# Patient Record
Sex: MALE | Race: WHITE | NOT HISPANIC OR LATINO | Employment: OTHER | URBAN - NONMETROPOLITAN AREA
[De-identification: names, ages, dates, MRNs, and addresses within clinical notes are randomized per-mention and may not be internally consistent; named-entity substitution may affect disease eponyms.]

---

## 2022-02-15 ENCOUNTER — HOSPITAL ENCOUNTER (EMERGENCY)
Facility: HOSPITAL | Age: 57
Discharge: HOME | End: 2022-02-15
Attending: EMERGENCY MEDICINE | Admitting: EMERGENCY MEDICINE
Payer: COMMERCIAL

## 2022-02-15 ENCOUNTER — APPOINTMENT (EMERGENCY)
Dept: RADIOLOGY | Facility: HOSPITAL | Age: 57
End: 2022-02-15
Payer: COMMERCIAL

## 2022-02-15 VITALS
HEART RATE: 66 BPM | RESPIRATION RATE: 18 BRPM | TEMPERATURE: 97.4 F | SYSTOLIC BLOOD PRESSURE: 126 MMHG | WEIGHT: 190 LBS | OXYGEN SATURATION: 97 % | DIASTOLIC BLOOD PRESSURE: 71 MMHG

## 2022-02-15 DIAGNOSIS — M54.12 CERVICAL RADICULOPATHY: Primary | ICD-10-CM

## 2022-02-15 DIAGNOSIS — M62.838 NECK MUSCLE SPASM: ICD-10-CM

## 2022-02-15 PROCEDURE — 99284 EMERGENCY DEPT VISIT MOD MDM: CPT | Performed by: EMERGENCY MEDICINE

## 2022-02-15 PROCEDURE — 96372 THER/PROPH/DIAG INJ SC/IM: CPT

## 2022-02-15 PROCEDURE — 99283 EMERGENCY DEPT VISIT LOW MDM: CPT

## 2022-02-15 PROCEDURE — 72040 X-RAY EXAM NECK SPINE 2-3 VW: CPT

## 2022-02-15 RX ORDER — ROSUVASTATIN CALCIUM 10 MG/1
TABLET, COATED ORAL
COMMUNITY
Start: 2021-12-03

## 2022-02-15 RX ORDER — CYCLOBENZAPRINE HCL 10 MG
10 TABLET ORAL ONCE
Status: COMPLETED | OUTPATIENT
Start: 2022-02-15 | End: 2022-02-15

## 2022-02-15 RX ORDER — KETOROLAC TROMETHAMINE 30 MG/ML
30 INJECTION, SOLUTION INTRAMUSCULAR; INTRAVENOUS ONCE
Status: COMPLETED | OUTPATIENT
Start: 2022-02-15 | End: 2022-02-15

## 2022-02-15 RX ORDER — CELECOXIB 100 MG/1
100 CAPSULE ORAL 2 TIMES DAILY PRN
Qty: 30 CAPSULE | Refills: 0 | Status: SHIPPED | OUTPATIENT
Start: 2022-02-15 | End: 2022-04-04

## 2022-02-15 RX ORDER — CYCLOBENZAPRINE HCL 10 MG
10 TABLET ORAL 2 TIMES DAILY PRN
Qty: 20 TABLET | Refills: 0 | Status: SHIPPED | OUTPATIENT
Start: 2022-02-15 | End: 2022-03-14

## 2022-02-15 RX ADMIN — KETOROLAC TROMETHAMINE 30 MG: 30 INJECTION, SOLUTION INTRAMUSCULAR at 10:18

## 2022-02-15 RX ADMIN — CYCLOBENZAPRINE HYDROCHLORIDE 10 MG: 10 TABLET, FILM COATED ORAL at 10:19

## 2022-02-15 NOTE — ED PROVIDER NOTES
History  Chief Complaint   Patient presents with    Neck Pain     history of herniated disc in his neck this pain feels similiar to how it felt before  took celebrex for the pain, no motrin ot APAP     Patient is a 61-year-old male with history of herniated disc in the cervical spine who presents emergency department complaining of neck pain and spasm on the left side in the lower neck without any trauma or injury started about 1 week ago worse movement and activity has been working overhead at work which exacerbates the pain no focal numbness or weakness  History provided by:  Patient  Neck Pain  Pain location:  L side  Quality:  Aching and cramping  Pain radiates to:  L shoulder  Pain severity:  Moderate  Onset quality:  Gradual  Duration:  1 week  Timing:  Constant  Progression:  Worsening  Chronicity:  New  Associated symptoms: no chest pain, no fever, no headaches, no numbness and no weakness        None       Past Medical History:   Diagnosis Date    Herniated disc, cervical        Past Surgical History:   Procedure Laterality Date    JOINT REPLACEMENT      knee bilateral        No family history on file  I have reviewed and agree with the history as documented  E-Cigarette/Vaping     E-Cigarette/Vaping Substances     Social History     Tobacco Use    Smoking status: Not on file    Smokeless tobacco: Not on file   Substance Use Topics    Alcohol use: Not on file    Drug use: Not on file       Review of Systems   Constitutional: Negative for activity change, appetite change, chills, fatigue and fever  HENT: Negative for congestion, ear pain, rhinorrhea and sore throat  Eyes: Negative for discharge, redness and visual disturbance  Respiratory: Negative for cough, chest tightness, shortness of breath and wheezing  Cardiovascular: Negative for chest pain and palpitations  Gastrointestinal: Negative for abdominal pain, constipation, diarrhea, nausea and vomiting     Endocrine: Negative for polydipsia and polyuria  Genitourinary: Negative for difficulty urinating, dysuria, frequency, hematuria and urgency  Musculoskeletal: Positive for neck pain and neck stiffness  Negative for arthralgias and myalgias  Skin: Negative for color change, pallor and rash  Neurological: Negative for dizziness, weakness, light-headedness, numbness and headaches  Hematological: Negative for adenopathy  Does not bruise/bleed easily  All other systems reviewed and are negative  Physical Exam  Physical Exam  Vitals and nursing note reviewed  Constitutional:       Appearance: He is well-developed  HENT:      Head: Normocephalic and atraumatic  Right Ear: External ear normal       Left Ear: External ear normal       Nose: Nose normal    Eyes:      Conjunctiva/sclera: Conjunctivae normal       Pupils: Pupils are equal, round, and reactive to light  Cardiovascular:      Rate and Rhythm: Normal rate and regular rhythm  Heart sounds: Normal heart sounds  Pulmonary:      Effort: Pulmonary effort is normal  No respiratory distress  Breath sounds: Normal breath sounds  No wheezing or rales  Chest:      Chest wall: No tenderness  Abdominal:      General: Bowel sounds are normal  There is no distension  Palpations: Abdomen is soft  Tenderness: There is no abdominal tenderness  There is no guarding  Musculoskeletal:         General: Normal range of motion  Cervical back: Normal range of motion and neck supple  Spasms and tenderness present  Pain with movement and muscular tenderness present  No spinous process tenderness  Skin:     General: Skin is warm and dry  Neurological:      Mental Status: He is alert and oriented to person, place, and time  Cranial Nerves: No cranial nerve deficit  Sensory: No sensory deficit           Vital Signs  ED Triage Vitals   Temperature Pulse Respirations Blood Pressure SpO2   02/15/22 0958 02/15/22 0958 02/15/22 0958 02/15/22 1000 02/15/22 1000   (!) 97 4 °F (36 3 °C) 66 18 126/71 97 %      Temp Source Heart Rate Source Patient Position - Orthostatic VS BP Location FiO2 (%)   02/15/22 0958 02/15/22 0958 02/15/22 1000 02/15/22 1000 --   Temporal Monitor Sitting Right arm       Pain Score       02/15/22 1000       7           Vitals:    02/15/22 0958 02/15/22 1000   BP:  126/71   Pulse: 66    Patient Position - Orthostatic VS:  Sitting         Visual Acuity      ED Medications  Medications   ketorolac (TORADOL) injection 30 mg (30 mg Intramuscular Given 2/15/22 1018)   cyclobenzaprine (FLEXERIL) tablet 10 mg (10 mg Oral Given 2/15/22 1019)       Diagnostic Studies  Results Reviewed     None                 XR cervical spine 2 or 3 views   ED Interpretation by Judeen Lesches, DO (02/15 1023)   Moderate degenerative changes in lower cervical spine no acute fracture or dislocation                 Procedures  Procedures         ED Course                               SBIRT 22yo+      Most Recent Value   SBIRT (25 yo +)    In order to provide better care to our patients, we are screening all of our patients for alcohol and drug use  Would it be okay to ask you these screening questions? Yes Filed at: 02/15/2022 1007   Initial Alcohol Screen: US AUDIT-C     1  How often do you have a drink containing alcohol? 0 Filed at: 02/15/2022 1007   2  How many drinks containing alcohol do you have on a typical day you are drinking? 0 Filed at: 02/15/2022 1007   3a  Male UNDER 65: How often do you have five or more drinks on one occasion? 0 Filed at: 02/15/2022 1007   3b  FEMALE Any Age, or MALE 65+: How often do you have 4 or more drinks on one occassion? 0 Filed at: 02/15/2022 1007   Audit-C Score 0 Filed at: 02/15/2022 1007   NANCI: How many times in the past year have you    Used an illegal drug or used a prescription medication for non-medical reasons?  Never Filed at: 02/15/2022 1007                    MDM  Number of Diagnoses or Management Options  Cervical radiculopathy: established and worsening  Neck muscle spasm: new and requires workup  Diagnosis management comments: Patient is clinically hemodynamically stable in the emergency department no alarm features of the neck pain present to necessitate emergent neuroimaging the spine  Degenerative changes seen on x-ray history examination consistent with cervical radiculopathy with muscle spasm in the left paraspinal and trapezius muscle  Will treat with NSAIDs and muscle relaxers for now advised supportive care prompt follow-up with PCP and Pain and Spine Medicine referral provided patient has already contacted pain medicine and has appointment arranged for tomorrow for further evaluation and treatment  Return precautions and anticipatory guidance discussed  Amount and/or Complexity of Data Reviewed  Tests in the radiology section of CPT®: ordered and reviewed  Decide to obtain previous medical records or to obtain history from someone other than the patient: yes  Review and summarize past medical records: yes  Independent visualization of images, tracings, or specimens: yes    Risk of Complications, Morbidity, and/or Mortality  Presenting problems: low  Diagnostic procedures: low  Management options: low    Patient Progress  Patient progress: stable      Disposition  Final diagnoses:   Cervical radiculopathy   Neck muscle spasm     Time reflects when diagnosis was documented in both MDM as applicable and the Disposition within this note     Time User Action Codes Description Comment    2/15/2022 10:07 AM Ashtyn Esparza Add [M54 12] Cervical radiculopathy     2/15/2022 10:07 AM Ashtyn Esparza Add [U93 797] Neck muscle spasm       ED Disposition     ED Disposition Condition Date/Time Comment    Discharge Stable Tue Feb 15, 2022 10:07 AM Kimberley Sutton discharge to home/self care              Follow-up Information     Follow up With Specialties Details Why Contact Info    Trina Quintana MD Gastroenterology Schedule an appointment as soon as possible for a visit in 3 days  19 Agnes Urbina MD Pain Medicine Go in 1 day  [address] [place] [address]            Patient's Medications   Discharge Prescriptions    CELECOXIB (CELEBREX) 100 MG CAPSULE    Take 1 capsule (100 mg total) by mouth 2 (two) times a day as needed for mild pain       Start Date: 2/15/2022 End Date: 3/17/2022       Order Dose: 100 mg       Quantity: 30 capsule    Refills: 0    CYCLOBENZAPRINE (FLEXERIL) 10 MG TABLET    Take 1 tablet (10 mg total) by mouth 2 (two) times a day as needed for muscle spasms       Start Date: 2/15/2022 End Date: --       Order Dose: 10 mg       Quantity: 20 tablet    Refills: 0           PDMP Review     None          ED Provider  Electronically Signed by           Jean Nguyen DO  02/15/22 1024

## 2022-02-16 ENCOUNTER — CONSULT (OUTPATIENT)
Dept: PAIN MEDICINE | Facility: CLINIC | Age: 57
End: 2022-02-16
Payer: COMMERCIAL

## 2022-02-16 VITALS
HEIGHT: 70 IN | WEIGHT: 191.6 LBS | BODY MASS INDEX: 27.43 KG/M2 | SYSTOLIC BLOOD PRESSURE: 129 MMHG | DIASTOLIC BLOOD PRESSURE: 79 MMHG | HEART RATE: 72 BPM | TEMPERATURE: 97.8 F | RESPIRATION RATE: 20 BRPM

## 2022-02-16 DIAGNOSIS — M54.12 CERVICAL RADICULOPATHY: Primary | ICD-10-CM

## 2022-02-16 DIAGNOSIS — M62.838 NECK MUSCLE SPASM: ICD-10-CM

## 2022-02-16 PROCEDURE — 99204 OFFICE O/P NEW MOD 45 MIN: CPT | Performed by: ANESTHESIOLOGY

## 2022-02-16 RX ORDER — LIDOCAINE HYDROCHLORIDE 10 MG/ML
5 INJECTION, SOLUTION EPIDURAL; INFILTRATION; INTRACAUDAL; PERINEURAL ONCE
Status: CANCELLED | OUTPATIENT
Start: 2022-02-16 | End: 2022-02-16

## 2022-02-16 RX ORDER — GABAPENTIN 300 MG/1
300 CAPSULE ORAL 3 TIMES DAILY
Qty: 90 CAPSULE | Refills: 0 | Status: SHIPPED | OUTPATIENT
Start: 2022-02-16 | End: 2022-03-14

## 2022-02-16 RX ORDER — METHYLPREDNISOLONE ACETATE 80 MG/ML
80 INJECTION, SUSPENSION INTRA-ARTICULAR; INTRALESIONAL; INTRAMUSCULAR; PARENTERAL; SOFT TISSUE ONCE
Status: CANCELLED | OUTPATIENT
Start: 2022-02-16 | End: 2022-02-16

## 2022-02-16 RX ORDER — 0.9 % SODIUM CHLORIDE 0.9 %
1 VIAL (ML) INJECTION ONCE
Status: CANCELLED | OUTPATIENT
Start: 2022-02-16 | End: 2022-02-16

## 2022-02-16 NOTE — H&P (VIEW-ONLY)
Assessment  1  Cervical radiculopathy  -     gabapentin (NEURONTIN) 300 mg capsule; Take 1 capsule (300 mg total) by mouth 3 (three) times a day  -     Ambulatory referral to Physical Therapy; Future  -     Case request operating room: BLOCK / INJECTION EPIDURAL STEROID CERVICAL C7-T1; Standing  -     Case request operating room: BLOCK / INJECTION EPIDURAL STEROID CERVICAL C7-T1  -     Ambulatory Referral to Pain Management    2  Neck muscle spasm  -     Ambulatory Referral to Pain Management    Left-sided neck pain described primarily radicular features into the C5 and C6 dermatomal distribution accompanied by pain limited weakness numbness and paresthesias  +spurling's maneuver left sided; ttp over cervical paraspinal muscles, pain with cervical facet loading bilaterally, left greater than right  MRI from 2014 reviewed which shows moderate broad-based posterior disc protrusion at C5-C6  Superimposed spondylosis and facet degenerative changes resulting in moderate canal stenosis and marked left and moderate right neural foraminal narrowing  Additionally C6-C7 there is moderate broad-based disc protrusion asymmetrically increased to the left lateral reason at C6-C7  There is findings suspicious for some degree of neural compression  Superimposed spondylosis and changes resulting in mild canal narrowing  Marked left and mild right neural foraminal stenosis  Reasonable at this time to proceed with multimodal pain therapy plan focusing on physical therapy in conjunction with medications and epidural steroid injection to target radicular pain  Plan  -C7-T1 ILESI or alternate level  -gabapentin 300 mg t i d  Ordered for patient; counseled regarding sedative effects of taking this medication and provided up titration calendar  Counseled not to take medication while driving or operating heavy machinery/using stairs  -celebrex 100 mg b i d  prn pain previously prescribed    Patient educated regarding bleeding risk of taking this medication not taking any other nonsteroidal anti-inflammatory medications while taking this medication; counseled thoroughly regarding potential risk of Cardiovascular injury, Kidney injury, Gastrointestinal ulceration/bleeding  Patient voiced understanding  -physical therapy for left sided cervical radiculopathy; Physician directed home exercise plan as per [employer] demonstrated and handouts provided that patient plans to participate with for 1 hour, twice a week for the next 6 weeks  There are risks associated with opioid medications, including dependence, addiction and tolerance  The patient understands and agrees to use these medications only as prescribed  Potential side effects of the medications include, but are not limited to, constipation, drowsiness, addiction, impaired judgment and risk of fatal overdose if not taken as prescribed  The patient was warned against driving while taking sedation medications  Sharing medications is a felony  At this point in time, the patient is showing no signs of addiction, abuse, diversion or suicidal ideation  [place] Prescription Drug Monitoring Program report was reviewed and was appropriate      Complete risks and benefits including bleeding, infection, tissue reaction, nerve injury and allergic reaction were discussed  The approach was demonstrated using models and literature was provided  Verbal and written consent was obtained  My impressions and treatment recommendations were discussed in detail with the patient who verbalized understanding and had no further questions  Discharge instructions were provided  I personally saw and examined the patient and I agree with the above discussed plan of care  My impressions and treatment recommendations were discussed in detail with the patient who verbalized understanding and had no further questions  Discharge instructions were provided   I personally saw and examined the patient and I agree with the above discussed plan of care  New Medications Ordered This Visit   Medications    Omega-3 Fatty Acids (FISH OIL PO)     Sig: Take by mouth    Magnesium Hydroxide (MAGNESIA PO)     Sig: Take by mouth    rosuvastatin (Crestor) 10 MG tablet     Sig: Take by mouth    gabapentin (NEURONTIN) 300 mg capsule     Sig: Take 1 capsule (300 mg total) by mouth 3 (three) times a day     Dispense:  90 capsule     Refill:  0       History of Present Illness    Refugio Lim is a 64 y o  male with pmhx of XOL presenting with a past medical history of left-sided neck pain described primarily as radicular nature  The pain radiates in the C5 and C6 dermatomal distributions and is primarily left-sided  Patient had an acute exacerbation of the pain over the past few days after engaging in strenuous overhead maneuvers  The pain contributes to significant disability in participation with independent activities of daily living and is accompanied by weakness numbness and paresthesias that are debilitating in nature  The patient describes that overhead maneuvers such as combing hair is significantly limiting with respect to strength in the left arm/hand  The patient notes significant pain limited weakness with  left hand  as well  The patient has not been to physical therapy but was recently seen in ED where imaging of cervical spine showed at least moderate spondylosis and disc degeneration at C4-C7  He has trialed conservative measures including celebrex and flexeril for the pain but has not trialed any steroids  He has never had interventional pain procedures in the past including any cervical interlaminar epidural steroid injections in the past for his pain  I have personally reviewed and/or updated the patient's past medical history, past surgical history, family history, social history, current medications, allergies, and vital signs today       Review of Systems   Constitutional: Positive for activity change  HENT: Negative  Eyes: Negative  Respiratory: Negative  Cardiovascular: Negative  Gastrointestinal: Negative  Endocrine: Negative  Genitourinary: Negative  Musculoskeletal: Positive for arthralgias, myalgias, neck pain and neck stiffness  Skin: Negative  Allergic/Immunologic: Negative  Neurological: Positive for weakness and numbness  Hematological: Negative  Psychiatric/Behavioral: Negative  All other systems reviewed and are negative  There is no problem list on file for this patient  Past Medical History:   Diagnosis Date    Herniated disc, cervical        Past Surgical History:   Procedure Laterality Date    JOINT REPLACEMENT      knee bilateral     REPLACEMENT TOTAL KNEE BILATERAL  2018 2019       Family History   Problem Relation Age of Onset    Arthritis Mother     Diabetes Father        Social History     Occupational History    Not on file   Tobacco Use    Smoking status: Never Smoker    Smokeless tobacco: Never Used   Substance and Sexual Activity    Alcohol use: Not on file     Comment: rarely     Drug use: Not on file    Sexual activity: Not on file       Current Outpatient Medications on File Prior to Visit   Medication Sig    celecoxib (CeleBREX) 100 mg capsule Take 1 capsule (100 mg total) by mouth 2 (two) times a day as needed for mild pain    Magnesium Hydroxide (MAGNESIA PO) Take by mouth    Omega-3 Fatty Acids (FISH OIL PO) Take by mouth    rosuvastatin (Crestor) 10 MG tablet Take by mouth    cyclobenzaprine (FLEXERIL) 10 mg tablet Take 1 tablet (10 mg total) by mouth 2 (two) times a day as needed for muscle spasms (Patient not taking: Reported on 2/16/2022 )     No current facility-administered medications on file prior to visit         No Known Allergies      Physical Exam    /79   Pulse 72   Temp 97 8 °F (36 6 °C)   Resp 20   Ht 5' 9 75" (1 772 m)   Wt 86 9 kg (191 lb 9 6 oz)   BMI 27 69 kg/m² Constitutional: normal, well developed, well nourished, alert, in no distress and non-toxic and no overt pain behavior  Eyes: anicteric  HEENT: grossly intact  Neck: supple, symmetric, trachea midline and no masses   Pulmonary:even and unlabored  Cardiovascular:No edema or pitting edema present  Skin:Normal without rashes or lesions and well hydrated  Psychiatric:Mood and affect appropriate  Neurologic:Cranial Nerves II-XII grossly intact Sensation grossly intact; no clonus negative kern's  Reflexes 2+ and brisk  Spurling's maneuver positive left sided  Musculoskeletal:normal gait  5/5 strength bilaterally with AROM in all extremities  signficant ain with cervical facet loading bilaterally and with lateral spine rotation  ttp over cervical paraspinal muscles  Imaging    CERVICAL SPINE     INDICATION:   neck pain      COMPARISON:  None     VIEWS:  XR SPINE CERVICAL 2 OR 3 [facility]W INJURY   Images: 3     FINDINGS:     No fracture or subluxation       Straightening of the usual lordosis      Moderate multilevel spondylitic changes C4-C7       The prevertebral soft tissues are within normal limits        The lung apices are clear      IMPRESSION:     No acute osseous abnormality      Degenerative changes as above       MRI from 2014 reviewed which shows moderate broad-based posterior disc protrusion at C5-C6  Superimposed spondylosis and facet degenerative changes resulting in moderate canal stenosis and marked left and moderate right neural foraminal narrowing  Additionally C6-C7 there is moderate broad-based disc protrusion asymmetrically increased to the left lateral reason at C6-C7  There is findings suspicious for some degree of neural compression  Superimposed spondylosis and changes resulting in mild canal narrowing  Marked left and mild right neural foraminal stenosis

## 2022-02-16 NOTE — PROGRESS NOTES
Assessment  1  Cervical radiculopathy  -     gabapentin (NEURONTIN) 300 mg capsule; Take 1 capsule (300 mg total) by mouth 3 (three) times a day  -     Ambulatory referral to Physical Therapy; Future  -     Case request operating room: BLOCK / INJECTION EPIDURAL STEROID CERVICAL C7-T1; Standing  -     Case request operating room: BLOCK / INJECTION EPIDURAL STEROID CERVICAL C7-T1  -     Ambulatory Referral to Pain Management    2  Neck muscle spasm  -     Ambulatory Referral to Pain Management    Left-sided neck pain described primarily radicular features into the C5 and C6 dermatomal distribution accompanied by pain limited weakness numbness and paresthesias  +spurling's maneuver left sided; ttp over cervical paraspinal muscles, pain with cervical facet loading bilaterally, left greater than right  MRI from 2014 reviewed which shows moderate broad-based posterior disc protrusion at C5-C6  Superimposed spondylosis and facet degenerative changes resulting in moderate canal stenosis and marked left and moderate right neural foraminal narrowing  Additionally C6-C7 there is moderate broad-based disc protrusion asymmetrically increased to the left lateral reason at C6-C7  There is findings suspicious for some degree of neural compression  Superimposed spondylosis and changes resulting in mild canal narrowing  Marked left and mild right neural foraminal stenosis  Reasonable at this time to proceed with multimodal pain therapy plan focusing on physical therapy in conjunction with medications and epidural steroid injection to target radicular pain  Plan  -C7-T1 ILESI or alternate level  -gabapentin 300 mg t i d  Ordered for patient; counseled regarding sedative effects of taking this medication and provided up titration calendar  Counseled not to take medication while driving or operating heavy machinery/using stairs  -celebrex 100 mg b i d  prn pain previously prescribed    Patient educated regarding bleeding risk of taking this medication not taking any other nonsteroidal anti-inflammatory medications while taking this medication; counseled thoroughly regarding potential risk of Cardiovascular injury, Kidney injury, Gastrointestinal ulceration/bleeding  Patient voiced understanding  -physical therapy for left sided cervical radiculopathy; Physician directed home exercise plan as per [facility] demonstrated and handouts provided that patient plans to participate with for 1 hour, twice a week for the next 6 weeks  There are risks associated with opioid medications, including dependence, addiction and tolerance  The patient understands and agrees to use these medications only as prescribed  Potential side effects of the medications include, but are not limited to, constipation, drowsiness, addiction, impaired judgment and risk of fatal overdose if not taken as prescribed  The patient was warned against driving while taking sedation medications  Sharing medications is a felony  At this point in time, the patient is showing no signs of addiction, abuse, diversion or suicidal ideation  [place] Prescription Drug Monitoring Program report was reviewed and was appropriate      Complete risks and benefits including bleeding, infection, tissue reaction, nerve injury and allergic reaction were discussed  The approach was demonstrated using models and literature was provided  Verbal and written consent was obtained  My impressions and treatment recommendations were discussed in detail with the patient who verbalized understanding and had no further questions  Discharge instructions were provided  I personally saw and examined the patient and I agree with the above discussed plan of care  My impressions and treatment recommendations were discussed in detail with the patient who verbalized understanding and had no further questions  Discharge instructions were provided   I personally saw and examined the patient and I agree with the above discussed plan of care  New Medications Ordered This Visit   Medications    Omega-3 Fatty Acids (FISH OIL PO)     Sig: Take by mouth    Magnesium Hydroxide (MAGNESIA PO)     Sig: Take by mouth    rosuvastatin (Crestor) 10 MG tablet     Sig: Take by mouth    gabapentin (NEURONTIN) 300 mg capsule     Sig: Take 1 capsule (300 mg total) by mouth 3 (three) times a day     Dispense:  90 capsule     Refill:  0       History of Present Illness    Mic Davis is a 64 y o  male with pmhx of XOL presenting with a past medical history of left-sided neck pain described primarily as radicular nature  The pain radiates in the C5 and C6 dermatomal distributions and is primarily left-sided  Patient had an acute exacerbation of the pain over the past few days after engaging in strenuous overhead maneuvers  The pain contributes to significant disability in participation with independent activities of daily living and is accompanied by weakness numbness and paresthesias that are debilitating in nature  The patient describes that overhead maneuvers such as combing hair is significantly limiting with respect to strength in the left arm/hand  The patient notes significant pain limited weakness with  left hand  as well  The patient has not been to physical therapy but was recently seen in ED where imaging of cervical spine showed at least moderate spondylosis and disc degeneration at C4-C7  He has trialed conservative measures including celebrex and flexeril for the pain but has not trialed any steroids  He has never had interventional pain procedures in the past including any cervical interlaminar epidural steroid injections in the past for his pain  I have personally reviewed and/or updated the patient's past medical history, past surgical history, family history, social history, current medications, allergies, and vital signs today       Review of Systems   Constitutional: Positive for activity change  HENT: Negative  Eyes: Negative  Respiratory: Negative  Cardiovascular: Negative  Gastrointestinal: Negative  Endocrine: Negative  Genitourinary: Negative  Musculoskeletal: Positive for arthralgias, myalgias, neck pain and neck stiffness  Skin: Negative  Allergic/Immunologic: Negative  Neurological: Positive for weakness and numbness  Hematological: Negative  Psychiatric/Behavioral: Negative  All other systems reviewed and are negative  There is no problem list on file for this patient  Past Medical History:   Diagnosis Date    Herniated disc, cervical        Past Surgical History:   Procedure Laterality Date    JOINT REPLACEMENT      knee bilateral     REPLACEMENT TOTAL KNEE BILATERAL  2018 2019       Family History   Problem Relation Age of Onset    Arthritis Mother     Diabetes Father        Social History     Occupational History    Not on file   Tobacco Use    Smoking status: Never Smoker    Smokeless tobacco: Never Used   Substance and Sexual Activity    Alcohol use: Not on file     Comment: rarely     Drug use: Not on file    Sexual activity: Not on file       Current Outpatient Medications on File Prior to Visit   Medication Sig    celecoxib (CeleBREX) 100 mg capsule Take 1 capsule (100 mg total) by mouth 2 (two) times a day as needed for mild pain    Magnesium Hydroxide (MAGNESIA PO) Take by mouth    Omega-3 Fatty Acids (FISH OIL PO) Take by mouth    rosuvastatin (Crestor) 10 MG tablet Take by mouth    cyclobenzaprine (FLEXERIL) 10 mg tablet Take 1 tablet (10 mg total) by mouth 2 (two) times a day as needed for muscle spasms (Patient not taking: Reported on 2/16/2022 )     No current facility-administered medications on file prior to visit         No Known Allergies      Physical Exam    /79   Pulse 72   Temp 97 8 °F (36 6 °C)   Resp 20   Ht 5' 9 75" (1 772 m)   Wt 86 9 kg (191 lb 9 6 oz)   BMI 27 69 kg/m² Constitutional: normal, well developed, well nourished, alert, in no distress and non-toxic and no overt pain behavior  Eyes: anicteric  HEENT: grossly intact  Neck: supple, symmetric, trachea midline and no masses   Pulmonary:even and unlabored  Cardiovascular:No edema or pitting edema present  Skin:Normal without rashes or lesions and well hydrated  Psychiatric:Mood and affect appropriate  Neurologic:Cranial Nerves II-XII grossly intact Sensation grossly intact; no clonus negative kern's  Reflexes 2+ and brisk  Spurling's maneuver positive left sided  Musculoskeletal:normal gait  5/5 strength bilaterally with AROM in all extremities  signficant ain with cervical facet loading bilaterally and with lateral spine rotation  ttp over cervical paraspinal muscles  Imaging    CERVICAL SPINE     INDICATION:   neck pain      COMPARISON:  None     VIEWS:  XR SPINE CERVICAL 2 OR 3 VW INJURY   Images: 3     FINDINGS:     No fracture or subluxation       Straightening of the usual lordosis      Moderate multilevel spondylitic changes C4-C7       The prevertebral soft tissues are within normal limits        The lung apices are clear      IMPRESSION:     No acute osseous abnormality      Degenerative changes as above       MRI from 2014 reviewed which shows moderate broad-based posterior disc protrusion at C5-C6  Superimposed spondylosis and facet degenerative changes resulting in moderate canal stenosis and marked left and moderate right neural foraminal narrowing  Additionally C6-C7 there is moderate broad-based disc protrusion asymmetrically increased to the left lateral reason at C6-C7  There is findings suspicious for some degree of neural compression  Superimposed spondylosis and changes resulting in mild canal narrowing  Marked left and mild right neural foraminal stenosis

## 2022-02-16 NOTE — LETTER
February 17, 2022     Zohreh Sales MD  [facility]  [address]  [address]    Patient: Efrain Irizarry   YOB: 1965   Date of Visit: 2/16/2022       Dear Dr Loyce Barthel: Thank you for referring Efrain Irizarry to me for evaluation  Below are my notes for this consultation  If you have questions, please do not hesitate to call me  I look forward to following your patient along with you  Sincerely,        Estelle Alpers, MD        CC: No Recipients  Estelle Alpers, MD  2/16/2022  9:05 PM  Signed      Assessment  1  Cervical radiculopathy  -     gabapentin (NEURONTIN) 300 mg capsule; Take 1 capsule (300 mg total) by mouth 3 (three) times a day  -     Ambulatory referral to Physical Therapy; Future  -     Case request operating room: BLOCK / INJECTION EPIDURAL STEROID CERVICAL C7-T1; Standing  -     Case request operating room: BLOCK / INJECTION EPIDURAL STEROID CERVICAL C7-T1  -     Ambulatory Referral to Pain Management    2  Neck muscle spasm  -     Ambulatory Referral to Pain Management    Left-sided neck pain described primarily radicular features into the C5 and C6 dermatomal distribution accompanied by pain limited weakness numbness and paresthesias  +spurling's maneuver left sided; ttp over cervical paraspinal muscles, pain with cervical facet loading bilaterally, left greater than right  MRI from 2014 reviewed which shows moderate broad-based posterior disc protrusion at C5-C6  Superimposed spondylosis and facet degenerative changes resulting in moderate canal stenosis and marked left and moderate right neural foraminal narrowing  Additionally C6-C7 there is moderate broad-based disc protrusion asymmetrically increased to the left lateral reason at C6-C7  There is findings suspicious for some degree of neural compression  Superimposed spondylosis and changes resulting in mild canal narrowing  Marked left and mild right neural foraminal stenosis    Reasonable at this time to proceed with multimodal pain therapy plan focusing on physical therapy in conjunction with medications and epidural steroid injection to target radicular pain  Plan  -C7-T1 ILESI or alternate level  -gabapentin 300 mg t i d  Ordered for patient; counseled regarding sedative effects of taking this medication and provided up titration calendar  Counseled not to take medication while driving or operating heavy machinery/using stairs  -celebrex 100 mg b i d  prn pain previously prescribed  Patient educated regarding bleeding risk of taking this medication not taking any other nonsteroidal anti-inflammatory medications while taking this medication; counseled thoroughly regarding potential risk of Cardiovascular injury, Kidney injury, Gastrointestinal ulceration/bleeding  Patient voiced understanding  -physical therapy for left sided cervical radiculopathy; Physician directed home exercise plan as per [facility] demonstrated and handouts provided that patient plans to participate with for 1 hour, twice a week for the next 6 weeks  There are risks associated with opioid medications, including dependence, addiction and tolerance  The patient understands and agrees to use these medications only as prescribed  Potential side effects of the medications include, but are not limited to, constipation, drowsiness, addiction, impaired judgment and risk of fatal overdose if not taken as prescribed  The patient was warned against driving while taking sedation medications  Sharing medications is a felony  At this point in time, the patient is showing no signs of addiction, abuse, diversion or suicidal ideation  [place] Prescription Drug Monitoring Program report was reviewed and was appropriate      Complete risks and benefits including bleeding, infection, tissue reaction, nerve injury and allergic reaction were discussed  The approach was demonstrated using models and literature was provided   Verbal and written consent was obtained  My impressions and treatment recommendations were discussed in detail with the patient who verbalized understanding and had no further questions  Discharge instructions were provided  I personally saw and examined the patient and I agree with the above discussed plan of care  My impressions and treatment recommendations were discussed in detail with the patient who verbalized understanding and had no further questions  Discharge instructions were provided  I personally saw and examined the patient and I agree with the above discussed plan of care  New Medications Ordered This Visit   Medications    Omega-3 Fatty Acids (FISH OIL PO)     Sig: Take by mouth    Magnesium Hydroxide (MAGNESIA PO)     Sig: Take by mouth    rosuvastatin (Crestor) 10 MG tablet     Sig: Take by mouth    gabapentin (NEURONTIN) 300 mg capsule     Sig: Take 1 capsule (300 mg total) by mouth 3 (three) times a day     Dispense:  90 capsule     Refill:  0       History of Present Illness    Elizabeth Garcia is a 64 y o  male with pmhx of XOL presenting with a past medical history of left-sided neck pain described primarily as radicular nature  The pain radiates in the C5 and C6 dermatomal distributions and is primarily left-sided  Patient had an acute exacerbation of the pain over the past few days after engaging in strenuous overhead maneuvers  The pain contributes to significant disability in participation with independent activities of daily living and is accompanied by weakness numbness and paresthesias that are debilitating in nature  The patient describes that overhead maneuvers such as combing hair is significantly limiting with respect to strength in the left arm/hand  The patient notes significant pain limited weakness with  left hand  as well   The patient has not been to physical therapy but was recently seen in ED where imaging of cervical spine showed at least moderate spondylosis and disc degeneration at C4-C7  He has trialed conservative measures including celebrex and flexeril for the pain but has not trialed any steroids  He has never had interventional pain procedures in the past including any cervical interlaminar epidural steroid injections in the past for his pain  I have personally reviewed and/or updated the patient's past medical history, past surgical history, family history, social history, current medications, allergies, and vital signs today  Review of Systems   Constitutional: Positive for activity change  HENT: Negative  Eyes: Negative  Respiratory: Negative  Cardiovascular: Negative  Gastrointestinal: Negative  Endocrine: Negative  Genitourinary: Negative  Musculoskeletal: Positive for arthralgias, myalgias, neck pain and neck stiffness  Skin: Negative  Allergic/Immunologic: Negative  Neurological: Positive for weakness and numbness  Hematological: Negative  Psychiatric/Behavioral: Negative  All other systems reviewed and are negative  There is no problem list on file for this patient        Past Medical History:   Diagnosis Date    Herniated disc, cervical        Past Surgical History:   Procedure Laterality Date    JOINT REPLACEMENT      knee bilateral     REPLACEMENT TOTAL KNEE BILATERAL  2018 2019       Family History   Problem Relation Age of Onset    Arthritis Mother     Diabetes Father        Social History     Occupational History    Not on file   Tobacco Use    Smoking status: Never Smoker    Smokeless tobacco: Never Used   Substance and Sexual Activity    Alcohol use: Not on file     Comment: rarely     Drug use: Not on file    Sexual activity: Not on file       Current Outpatient Medications on File Prior to Visit   Medication Sig    celecoxib (CeleBREX) 100 mg capsule Take 1 capsule (100 mg total) by mouth 2 (two) times a day as needed for mild pain    Magnesium Hydroxide (MAGNESIA PO) Take by mouth    Omega-3 Fatty Acids (FISH OIL PO) Take by mouth    rosuvastatin (Crestor) 10 MG tablet Take by mouth    cyclobenzaprine (FLEXERIL) 10 mg tablet Take 1 tablet (10 mg total) by mouth 2 (two) times a day as needed for muscle spasms (Patient not taking: Reported on 2/16/2022 )     No current facility-administered medications on file prior to visit  No Known Allergies      Physical Exam    /79   Pulse 72   Temp 97 8 °F (36 6 °C)   Resp 20   Ht 5' 9 75" (1 772 m)   Wt 86 9 kg (191 lb 9 6 oz)   BMI 27 69 kg/m²     Constitutional: normal, well developed, well nourished, alert, in no distress and non-toxic and no overt pain behavior  Eyes: anicteric  HEENT: grossly intact  Neck: supple, symmetric, trachea midline and no masses   Pulmonary:even and unlabored  Cardiovascular:No edema or pitting edema present  Skin:Normal without rashes or lesions and well hydrated  Psychiatric:Mood and affect appropriate  Neurologic:Cranial Nerves II-XII grossly intact Sensation grossly intact; no clonus negative kern's  Reflexes 2+ and brisk  Spurling's maneuver positive left sided  Musculoskeletal:normal gait  5/5 strength bilaterally with AROM in all extremities  signficant ain with cervical facet loading bilaterally and with lateral spine rotation  ttp over cervical paraspinal muscles  Imaging    CERVICAL SPINE     INDICATION:   neck pain      COMPARISON:  None     VIEWS:  XR SPINE CERVICAL 2 OR 3 VW INJURY   Images: 3     FINDINGS:     No fracture or subluxation       Straightening of the usual lordosis      Moderate multilevel spondylitic changes C4-C7       The prevertebral soft tissues are within normal limits        The lung apices are clear      IMPRESSION:     No acute osseous abnormality      Degenerative changes as above       MRI from 2014 reviewed which shows moderate broad-based posterior disc protrusion at C5-C6    Superimposed spondylosis and facet degenerative changes resulting in moderate canal stenosis and marked left and moderate right neural foraminal narrowing  Additionally C6-C7 there is moderate broad-based disc protrusion asymmetrically increased to the left lateral reason at C6-C7  There is findings suspicious for some degree of neural compression  Superimposed spondylosis and changes resulting in mild canal narrowing  Marked left and mild right neural foraminal stenosis

## 2022-02-16 NOTE — PATIENT INSTRUCTIONS
Neck Exercises   AMBULATORY CARE:   Neck exercises  help reduce neck pain, and improve neck movement and strength  Neck exercises also help prevent long-term neck problems  What you need to know about neck exercises:   · Do the exercises every day,  or as often as directed by your healthcare provider  · Move slowly, gently, and smoothly  Avoid fast or jerky motions  · Stand and sit the way your healthcare provider shows you  Good posture may reduce your neck pain  Check your posture often, even when you are not doing your neck exercises  How to perform neck exercises safely:   · Exercise position:  You may sit or stand while you do neck exercises  Face forward  Your shoulders should be straight and relaxed, with a good posture  · Head tilts, forward and back:  Gently bow your head and try to touch your chin to your chest  Your healthcare provider may tell you to push on the back of your neck to help bow your head  Raise your chin back to the starting position  Tilt your head back as far as possible so you are looking up at the ceiling  Your healthcare provider may tell you to lift your chin to help tilt your head back  Return your head to the starting position  · Head tilts, side to side:  Tilt your head, bringing your ear toward your shoulder  Then tilt your head toward the other shoulder  · Head turns:  Turn your head to look over your shoulder  Tilt your chin down and try to touch it to your shoulder  Do not raise your shoulder to your chin  Face forward again  Do the same on the other side  · Head rolls:  Slowly bring your chin toward your chest  Next, roll your head to the right  Your ear should be positioned over your shoulder  Hold this position for 5 seconds  Roll your head back toward your chest and to the left into the same position  Hold for 5 seconds  Gently roll your head back and around in a clockwise [place] 3 times   Next, move your head in the reverse direction (counterclockwise) in a [place] 3 times  Do not shrug your shoulders upwards while you do this exercise  Contact your healthcare provider if:   · Your pain does not get better, or gets worse  · You have questions or concerns about your condition, care, or exercise program     © Copyright [employer] 2021 Information is for End User's use only and may not be sold, redistributed or otherwise used for commercial purposes  All illustrations and images included in CareNotes® are the copyrighted property of [employer]  or [facility]   The above information is an  only  It is not intended as medical advice for individual conditions or treatments  Talk to your doctor, nurse or pharmacist before following any medical regimen to see if it is safe and effective for you

## 2022-02-17 ENCOUNTER — HOSPITAL ENCOUNTER (OUTPATIENT)
Facility: HOSPITAL | Age: 57
Setting detail: OUTPATIENT SURGERY
Discharge: HOME | End: 2022-02-17
Attending: ANESTHESIOLOGY | Admitting: ANESTHESIOLOGY
Payer: COMMERCIAL

## 2022-02-17 ENCOUNTER — APPOINTMENT (OUTPATIENT)
Dept: RADIOLOGY | Facility: HOSPITAL | Age: 57
End: 2022-02-17
Payer: COMMERCIAL

## 2022-02-17 VITALS
WEIGHT: 191 LBS | BODY MASS INDEX: 28.29 KG/M2 | TEMPERATURE: 98.3 F | SYSTOLIC BLOOD PRESSURE: 120 MMHG | OXYGEN SATURATION: 97 % | HEIGHT: 69 IN | DIASTOLIC BLOOD PRESSURE: 59 MMHG | HEART RATE: 73 BPM | RESPIRATION RATE: 18 BRPM

## 2022-02-17 PROCEDURE — 62321 NJX INTERLAMINAR CRV/THRC: CPT | Performed by: ANESTHESIOLOGY

## 2022-02-17 RX ORDER — LIDOCAINE HYDROCHLORIDE 10 MG/ML
5 INJECTION, SOLUTION EPIDURAL; INFILTRATION; INTRACAUDAL; PERINEURAL ONCE
Status: COMPLETED | OUTPATIENT
Start: 2022-02-17 | End: 2022-02-17

## 2022-02-17 RX ORDER — METHYLPREDNISOLONE ACETATE 80 MG/ML
80 INJECTION, SUSPENSION INTRA-ARTICULAR; INTRALESIONAL; INTRAMUSCULAR; PARENTERAL; SOFT TISSUE ONCE
Status: DISCONTINUED | OUTPATIENT
Start: 2022-02-17 | End: 2022-02-17 | Stop reason: HOSPADM

## 2022-02-17 RX ORDER — METHYLPREDNISOLONE ACETATE 80 MG/ML
INJECTION, SUSPENSION INTRA-ARTICULAR; INTRALESIONAL; INTRAMUSCULAR; SOFT TISSUE AS NEEDED
Status: DISCONTINUED | OUTPATIENT
Start: 2022-02-17 | End: 2022-02-17 | Stop reason: HOSPADM

## 2022-02-17 RX ORDER — 0.9 % SODIUM CHLORIDE 0.9 %
1 VIAL (ML) INJECTION ONCE
Status: COMPLETED | OUTPATIENT
Start: 2022-02-17 | End: 2022-02-17

## 2022-02-17 RX ORDER — ALPRAZOLAM 0.5 MG/1
0.5 TABLET ORAL ONCE
Status: COMPLETED | OUTPATIENT
Start: 2022-02-17 | End: 2022-02-17

## 2022-02-17 RX ADMIN — ALPRAZOLAM 0.5 MG: 0.5 TABLET ORAL at 10:08

## 2022-02-17 NOTE — DISCHARGE INSTRUCTIONS
PLEASE SCHEDULE 2 WEEK FOLLOW UP BY CALLING THE [facility] [place]: [phone]      Epidural Steroid Injection   AMBULATORY CARE:   What you need to know about an epidural steroid injection (JALEN):  An JALEN is a procedure to inject steroid medicine into the epidural space  The epidural space is between your spinal cord and vertebrae  Steroids reduce inflammation and fluid buildup in your spine that may be causing pain  You may be given pain medicine along with the steroids  How to prepare for an JALEN:  Your healthcare provider will talk to you about how to prepare for your procedure  He or she will tell you what medicines to take or not take on the day of your procedure  You may need to stop taking blood thinners or other medicines several days before your procedure  You may need to adjust any diabetes medicine you take on the day of your procedure  Steroid medicine can increase your blood sugar level  Arrange for someone to drive you home when you are discharged  What will happen during an JALEN:   · You will be given medicine to numb the procedure area  You will be awake for the procedure, but you will not feel pain  You may also be given medicine to help you relax  Contrast liquid will be used to help your healthcare provider see the area better  Tell the healthcare provider if you have ever had an allergic reaction to contrast liquid  · Your healthcare provider may place the needle into your neck area, middle of your back, or tailbone area  He may inject the medicine next to the nerves that are causing your pain  He may instead inject the medicine into a larger area of the epidural space  This helps the medicine spread to more nerves  Your healthcare provider will use a fluoroscope to help guide the needle to the right place  A fluoroscope is a type of x-ray   After the procedure, a bandage will be placed over the injection site to prevent infection  What will happen after an JALEN:  You will have a bandage over the injection site to prevent infection  Your healthcare provider will tell you when you can bathe and any activity guidelines  You will be able to go home  Risks of an JALEN:  You may have temporary or permanent nerve damage or paralysis  You may have bleeding or develop a serious infection, such as meningitis (swelling of the brain coverings)  An abscess may also develop  An abscess is a pus-filled area under the skin  You may need surgery to fix the abscess  You may have a seizure, anxiety, or trouble sleeping  If you are a man, you may have temporary erectile dysfunction (not able to have an erection)  Call your local emergency number (911 in the [address] [facility]) if:   · You have a seizure  · You have trouble moving your legs  Seek care immediately if:   · Blood soaks through your bandage  · You have a fever or chills, severe back pain, and the procedure area is sensitive to the touch  · You cannot control when you urinate or have a bowel movement  Call your doctor if:   · You have weakness or numbness in your legs  · Your wound is red, swollen, or draining pus  · You have nausea or are vomiting  · Your face or neck is red and you feel warm  · You have more pain than you had before the procedure  · You have swelling in your hands or feet  · You have questions or concerns about your condition or care  Care for your wound as directed: You may remove the bandage before you go to bed the day of your procedure  You may take a shower, but do not take a bath for at least 24 hours  Self-care:   · Do not drive,  use machines, or do strenuous activity for 24 hours after your procedure or as directed  · Continue other treatments  as directed  Steroid injections alone will not control your pain  The injections are meant to be used with other treatments, such as physical therapy      Follow up with your doctor as directed:  Write down your questions so you remember to ask them during your visits  © Copyright [employer] 2021 Information is for End User's use only and may not be sold, redistributed or otherwise used for commercial purposes  All illustrations and images included in CareNotes® are the copyrighted property of [employer]  or Danilo Noriega  The above information is an  only  It is not intended as medical advice for individual conditions or treatments  Talk to your doctor, nurse or pharmacist before following any medical regimen to see if it is safe and effective for you

## 2022-02-17 NOTE — OP NOTE
PERATIVE REPORT  PATIENT NAME: Marifer Vaughn    :  1965  MRN: [id number]  Pt Location:  GI ROOM 01    SURGERY DATE: 2022    Surgeon(s) and Role:      Dima Martinez MD - Primary    Preop Diagnosis:  Cervical radiculopathy [M54 12]    Post-Op Diagnosis Codes:     * Cervical radiculopathy [M54 12]    Procedure(s) (LRB):  BLOCK / INJECTION EPIDURAL STEROID CERVICAL C7-T1 (N/A)    Specimen(s):  * No specimens in log *    Estimated Blood Loss:   Minimal    Drains:  * No LDAs found *    Anesthesia Type:   Local    Operative Indications:  Cervical radiculopathy [M54 12]    Operative Findings:  C7-T1 epidurogram    Complications:   None    Procedure and Technique:  Please see detailed procedure note     I was present for the entire procedure    Patient Disposition:  PACU       SIGNATURE: Parker Ayala MD  DATE: 2022  TIME: 10:26 AM

## 2022-02-17 NOTE — PROCEDURES
Pre-procedure Diagnosis: Cervical Radiculopathy  Post-procedure Diagnosis: Cervical Radiculopathy  Procedure Title(s):  1  C7-T1 interlaminar epidural steroid injection      2  Intraoperative fluoroscopy  Attending Surgeon:   Jhoana Vazquez MD  Anesthesia:   Local     Indications: The patient is a 64y o  year-old male with a diagnosis of Cervical Radiculopathy  The patient's history and physical exam were reviewed  The risks, benefits and alternatives to the procedure were discussed, and all questions were answered to the patient's satisfaction  The patient agreed to proceed, and written informed consent was obtained  Procedure in Detail: The patient was brought into the procedure room and placed in the prone position on the fluoroscopy table  The area of the cervical spine was prepped with chlorhexidine gluconate solution times one and draped in a sterile manner  The C7-T1 interspace was identified and marked under AP fluoroscopy  The skin and subcutaneous tissues in the area were anesthetized with 1% lidocaine  A 18-gauge Tuohy epidural needle was directed toward the interspace under fluoroscopic guidance until the ligamentum flavum was engaged  The C-arm was oblique to the right to obtain a contra-lateral oblique view  From this point, a loss of resistance technique with saline was used to identify entrance of the needle into the epidural space  Once an appropriate loss was obtained, negative aspiration was confirmed, and 2 ml Omnipaque 240 contrast solution was injected  An appropriate epidurogram was noted  Then, after negative aspiration, a solution consisting of 1-mL depo-medrol (80mg/mL) and 2-mL preservative-free saline was easily injected  The needle was removed with a 1% lidocaine flush  The patient's back was cleaned and a bandage was placed over the site of needle insertion  Disposition: The patient tolerated the procedure well, and there were no apparent complications   The patient was taken to the recovery area where written discharge instructions for the procedure were given       Estimated Blood Loss: None  Specimens Obtained: N/A

## 2022-02-17 NOTE — INTERVAL H&P NOTE
H&P reviewed  After examining the patient I find no changes in the patients condition since the H&P had been written      Vitals:    02/17/22 1008   BP: 130/73   Pulse: 75   Resp: 20   Temp: 98 8 °F (37 1 °C)   SpO2: 97%

## 2022-02-24 ENCOUNTER — TELEPHONE (OUTPATIENT)
Dept: PAIN MEDICINE | Facility: CLINIC | Age: 57
End: 2022-02-24

## 2022-03-14 ENCOUNTER — OFFICE VISIT (OUTPATIENT)
Dept: PAIN MEDICINE | Facility: CLINIC | Age: 57
End: 2022-03-14
Payer: COMMERCIAL

## 2022-03-14 ENCOUNTER — TELEPHONE (OUTPATIENT)
Dept: PAIN MEDICINE | Facility: CLINIC | Age: 57
End: 2022-03-14

## 2022-03-14 VITALS
SYSTOLIC BLOOD PRESSURE: 118 MMHG | RESPIRATION RATE: 20 BRPM | HEART RATE: 71 BPM | DIASTOLIC BLOOD PRESSURE: 70 MMHG | BODY MASS INDEX: 28.26 KG/M2 | WEIGHT: 190.8 LBS | HEIGHT: 69 IN | TEMPERATURE: 97.5 F

## 2022-03-14 DIAGNOSIS — M54.12 CERVICAL RADICULOPATHY: Primary | ICD-10-CM

## 2022-03-14 DIAGNOSIS — F11.90 OPIOID USE, UNSPECIFIED, UNCOMPLICATED: ICD-10-CM

## 2022-03-14 DIAGNOSIS — G89.4 CHRONIC PAIN SYNDROME: ICD-10-CM

## 2022-03-14 PROCEDURE — 99214 OFFICE O/P EST MOD 30 MIN: CPT | Performed by: ANESTHESIOLOGY

## 2022-03-14 RX ORDER — OXYCODONE HYDROCHLORIDE AND ACETAMINOPHEN 5; 325 MG/1; MG/1
1 TABLET ORAL EVERY 6 HOURS PRN
Qty: 120 TABLET | Refills: 0 | Status: SHIPPED | OUTPATIENT
Start: 2022-03-14 | End: 2022-04-04

## 2022-03-14 RX ORDER — METHYLPREDNISOLONE 4 MG/1
TABLET ORAL
Qty: 21 TABLET | Refills: 0 | Status: SHIPPED | OUTPATIENT
Start: 2022-03-14 | End: 2022-04-04

## 2022-03-14 RX ORDER — GABAPENTIN 600 MG/1
600 TABLET ORAL 3 TIMES DAILY
Qty: 270 TABLET | Refills: 0 | Status: SHIPPED | OUTPATIENT
Start: 2022-03-14 | End: 2022-04-04

## 2022-03-14 NOTE — H&P (VIEW-ONLY)
Assessment  1  Cervical radiculopathy  -     oxyCODONE-acetaminophen (Percocet) 5-325 mg per tablet; Take 1 tablet by mouth every 6 (six) hours as needed for moderate pain Max Daily Amount: 4 tablets  -     gabapentin (Neurontin) 600 MG tablet; Take 1 tablet (600 mg total) by mouth 3 (three) times a day  -     methylPREDNISolone 4 MG tablet therapy pack; Use as directed on package  -     Rapid drug screen, urine  -     MM ALL_Prescribed Meds and Special Instructions  -     MM DT_Alprazolam Definitive Test  -     MM DT_Amphetamine Definitive Test  -     MM DT_Buprenorphine Definitive Test  -     MM DT_Bupropion Definitive Test  -     MM DT_Carisoprodol Definitive Test  -     MM DT_Citalopram/Escitalopram Definitive Test  -     MM DT_Clonazepam Definitive Test  -     MM DT_Cocaine Definitive Test  -     MM DT_Codeine Definitive Test  -     MM Diazepam Definitive Test  -     MM DT_Ethyl Glucuronide Screen and Confirm Positive  -     MM DT_Fentanyl Definitive Test  -     MM DT_Heroin Definitive Test  -     MM DT_Hydrocodone Definitive Test  -     MM DT_Hydromorphone Definitive Test  -     MM DT_Kratom Definitive Test  -     MM Lorazepam Definitive Test  -     MM DT_MDMA Definitive Test  -     MM DT_Methadone Definitive Test  -     MM DT_Methamphetamine Definitive Test  -     MM DT_Morphine Definitive Test  -     MM DT_Oxazepam Definitive Test  -     MM DT_Oxycodone Definitive Test  -     MM DT_Oxymorphone Definitive Test  -     MM DT_Pregablin Definitive  -     MM DT_Tapentadol Definitive Test  -     MM DT_Temazapam Definitive Test  -     MM DT_THC Definitive Test  -     MM DT_Tramadol Definitive Test  -     MM DT_Validity Creatinine  -     MM DT_Validity Oxidant  -     MM DT_Validity pH  -     MM DT_Validity Specific    2  Opioid use, unspecified, uncomplicated  -     Rapid drug screen, urine    3   Chronic pain syndrome  -     Rapid drug screen, urine    Greater than 50% relief of pain with improved ability to participate with IADLs after ILESI at C7-T1 for approximately 1 month  Symptoms have recurred and patient is in debilitating pain  New MRI cervical spine noncontrast shows moderate 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 with marked left transforaminal stenosis  Risks, benefits and alternatives discussed with regard to opioid therapy, repeat injection and referral to [address] [place] [address] for possible acdf, decompression; has appt tomorrow to see spine surgeon  Previously reported the following symptomatology:     Left-sided neck pain described primarily radicular features into the C5 and C6 dermatomal distribution accompanied by pain limited weakness numbness and paresthesias  +spurling's maneuver left sided; ttp over cervical paraspinal muscles, pain with cervical facet loading bilaterally, left greater than right  MRI from 2014 reviewed which shows moderate broad-based posterior disc protrusion at C5-C6  Superimposed spondylosis and facet degenerative changes resulting in moderate canal stenosis and marked left and moderate right neural foraminal narrowing  Additionally C6-C7 there is moderate broad-based disc protrusion asymmetrically increased to the left lateral reason at C6-C7  There is findings suspicious for some degree of neural compression  Superimposed spondylosis and changes resulting in mild canal narrowing  Marked left and mild right neural foraminal stenosis  Reasonable at this time to proceed with multimodal pain therapy plan focusing on physical therapy in conjunction with medications and epidural steroid injection to target radicular pain      Plan  -C7-T1 ILESI or alternate level deferred for now  -medrol dose pack rx;   -opioid use agreement signed by both parties; uds obtained; had been taking old percocet; risks discussed below; percocet 5-325mg [id number] prn moderate to severe pain   -gabapentin 300 mg t i d  increased to 600mg TID; counseled regarding sedative effects of taking this medication and provided up titration calendar  Counseled not to take medication while driving or operating heavy machinery/using stairs  -celebrex 100 mg b i d  prn pain previously prescribed  Patient educated regarding bleeding risk of taking this medication not taking any other nonsteroidal anti-inflammatory medications while taking this medication; counseled thoroughly regarding potential risk of Cardiovascular injury, Kidney injury, Gastrointestinal ulceration/bleeding  Patient voiced understanding  -physical therapy for left sided cervical radiculopathy; Physician directed home exercise plan as per [facility] demonstrated and handouts provided that patient plans to participate with for 1 hour, twice a week for the next 6 weeks  There are risks associated with opioid medications, including dependence, addiction and tolerance  The patient understands and agrees to use these medications only as prescribed  Potential side effects of the medications include, but are not limited to, constipation, drowsiness, addiction, impaired judgment and risk of fatal overdose if not taken as prescribed  The patient was warned against driving while taking sedation medications  Sharing medications is a felony  At this point in time, the patient is showing no signs of addiction, abuse, diversion or suicidal ideation  [place] Prescription Drug Monitoring Program report was reviewed and was appropriate      Complete risks and benefits including bleeding, infection, tissue reaction, nerve injury and allergic reaction were discussed  The approach was demonstrated using models and literature was provided  Verbal and written consent was obtained  My impressions and treatment recommendations were discussed in detail with the patient who verbalized understanding and had no further questions  Discharge instructions were provided  I personally saw and examined the patient and I agree with the above discussed plan of care      My impressions and treatment recommendations were discussed in detail with the patient who verbalized understanding and had no further questions  Discharge instructions were provided  I personally saw and examined the patient and I agree with the above discussed plan of care  New Medications Ordered This Visit   Medications    oxyCODONE-acetaminophen (Percocet) 5-325 mg per tablet     Sig: Take 1 tablet by mouth every 6 (six) hours as needed for moderate pain Max Daily Amount: 4 tablets     Dispense:  120 tablet     Refill:  0    gabapentin (Neurontin) 600 MG tablet     Sig: Take 1 tablet (600 mg total) by mouth 3 (three) times a day     Dispense:  270 tablet     Refill:  0    methylPREDNISolone 4 MG tablet therapy pack     Sig: Use as directed on package     Dispense:  21 tablet     Refill:  0       History of Present Illness    Greater than 50% relief of pain with improved ability to participate with IADLs after ILESI at C7-T1 for approximately 1 month  Symptoms have recurred and patient is in debilitating pain  New MRI cervical spine noncontrast shows moderate 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 with marked left transforaminal stenosis  Risks, benefits and alternatives discussed with regard to opioid therapy, repeat injection and referral to [address] [place] [address] for possible acdf, decompression; has appt tomorrow to see spine surgeon  Previously reported the following symptomatology:     Kevin Kay is a 64 y o  male with pmhx of XOL presenting with a past medical history of left-sided neck pain described primarily as radicular nature  The pain radiates in the C5 and C6 dermatomal distributions and is primarily left-sided  Patient had an acute exacerbation of the pain over the past few days after engaging in strenuous overhead maneuvers    The pain contributes to significant disability in participation with independent activities of daily living and is accompanied by weakness numbness and paresthesias that are debilitating in nature  The patient describes that overhead maneuvers such as combing hair is significantly limiting with respect to strength in the left arm/hand  The patient notes significant pain limited weakness with  left hand  as well  The patient has not been to physical therapy but was recently seen in ED where imaging of cervical spine showed at least moderate spondylosis and disc degeneration at C4-C7  He has trialed conservative measures including celebrex and flexeril for the pain but has not trialed any steroids  He has never had interventional pain procedures in the past including any cervical interlaminar epidural steroid injections in the past for his pain  I have personally reviewed and/or updated the patient's past medical history, past surgical history, family history, social history, current medications, allergies, and vital signs today  Review of Systems   Constitutional: Positive for activity change  HENT: Negative  Eyes: Negative  Respiratory: Negative  Cardiovascular: Negative  Gastrointestinal: Negative  Endocrine: Negative  Genitourinary: Negative  Musculoskeletal: Positive for arthralgias, myalgias, neck pain and neck stiffness  Skin: Negative  Allergic/Immunologic: Negative  Neurological: Positive for weakness and numbness  Hematological: Negative  Psychiatric/Behavioral: Negative  All other systems reviewed and are negative  There is no problem list on file for this patient        Past Medical History:   Diagnosis Date    Herniated disc, cervical        Past Surgical History:   Procedure Laterality Date    EPIDURAL BLOCK INJECTION N/A 2/17/2022    Procedure: BLOCK / INJECTION EPIDURAL STEROID CERVICAL C7-T1;  Surgeon: Kayleen Mercer MD;  [facility];  Service: Pain Management     JOINT REPLACEMENT      knee bilateral     REPLACEMENT TOTAL KNEE BILATERAL  2018 2019       Family History   Problem Relation Age of Onset    Arthritis Mother     Diabetes Father        Social History     Occupational History    Not on file   Tobacco Use    Smoking status: Never Smoker    Smokeless tobacco: Never Used   Vaping Use    Vaping Use: Never used   Substance and Sexual Activity    Alcohol use: Never     Comment: rarely     Drug use: Never    Sexual activity: Not on file       Current Outpatient Medications on File Prior to Visit   Medication Sig    celecoxib (CeleBREX) 100 mg capsule Take 1 capsule (100 mg total) by mouth 2 (two) times a day as needed for mild pain    Magnesium Hydroxide (MAGNESIA PO) Take by mouth    Omega-3 Fatty Acids (FISH OIL PO) Take by mouth    rosuvastatin (Crestor) 10 MG tablet Take by mouth    [DISCONTINUED] gabapentin (NEURONTIN) 300 mg capsule Take 1 capsule (300 mg total) by mouth 3 (three) times a day    [DISCONTINUED] cyclobenzaprine (FLEXERIL) 10 mg tablet Take 1 tablet (10 mg total) by mouth 2 (two) times a day as needed for muscle spasms (Patient not taking: Reported on 2/16/2022 )     No current facility-administered medications on file prior to visit  No Known Allergies      Physical Exam    /70   Pulse 71   Temp 97 5 °F (36 4 °C)   Resp 20   Ht 5' 9" (1 753 m)   Wt 86 5 kg (190 lb 12 8 oz)   BMI 28 18 kg/m²     Constitutional: normal, well developed, well nourished, alert, in no distress and non-toxic and no overt pain behavior  Eyes: anicteric  HEENT: grossly intact  Neck: supple, symmetric, trachea midline and no masses   Pulmonary:even and unlabored  Cardiovascular:No edema or pitting edema present  Skin:Normal without rashes or lesions and well hydrated  Psychiatric:Mood and affect appropriate  Neurologic:Cranial Nerves II-XII grossly intact Sensation grossly intact; no clonus negative kern's  Reflexes 2+ and brisk  Spurling's maneuver positive left sided  Musculoskeletal:normal gait  5/5 strength bilaterally with AROM in all extremities  signficant ain with cervical facet loading bilaterally and with lateral spine rotation  ttp over cervical paraspinal muscles  Imaging    CERVICAL SPINE     INDICATION:   neck pain      COMPARISON:  None     VIEWS:  [facility] CERVICAL 2 OR 3 VW INJURY   Images: 3     FINDINGS:     No fracture or subluxation       Straightening of the usual lordosis      Moderate multilevel spondylitic changes C4-C7       The prevertebral soft tissues are within normal limits        The lung apices are clear      IMPRESSION:     No acute osseous abnormality      Degenerative changes as above       MRI from 2014 reviewed which shows moderate broad-based posterior disc protrusion at C5-C6  Superimposed spondylosis and facet degenerative changes resulting in moderate canal stenosis and marked left and moderate right neural foraminal narrowing  Additionally C6-C7 there is moderate broad-based disc protrusion asymmetrically increased to the left lateral reason at C6-C7  There is findings suspicious for some degree of neural compression  Superimposed spondylosis and changes resulting in mild canal narrowing  Marked left and mild right neural foraminal stenosis

## 2022-03-14 NOTE — PATIENT INSTRUCTIONS
Neck Exercises   AMBULATORY CARE:   Neck exercises  help reduce neck pain, and improve neck movement and strength  Neck exercises also help prevent long-term neck problems  What you need to know about neck exercises:   · Do the exercises every day,  or as often as directed by your healthcare provider  · Move slowly, gently, and smoothly  Avoid fast or jerky motions  · Stand and sit the way your healthcare provider shows you  Good posture may reduce your neck pain  Check your posture often, even when you are not doing your neck exercises  How to perform neck exercises safely:   · Exercise position:  You may sit or stand while you do neck exercises  Face forward  Your shoulders should be straight and relaxed, with a good posture  · Head tilts, forward and back:  Gently bow your head and try to touch your chin to your chest  Your healthcare provider may tell you to push on the back of your neck to help bow your head  Raise your chin back to the starting position  Tilt your head back as far as possible so you are looking up at the ceiling  Your healthcare provider may tell you to lift your chin to help tilt your head back  Return your head to the starting position  · Head tilts, side to side:  Tilt your head, bringing your ear toward your shoulder  Then tilt your head toward the other shoulder  · Head turns:  Turn your head to look over your shoulder  Tilt your chin down and try to touch it to your shoulder  Do not raise your shoulder to your chin  Face forward again  Do the same on the other side  · Head rolls:  Slowly bring your chin toward your chest  Next, roll your head to the right  Your ear should be positioned over your shoulder  Hold this position for 5 seconds  Roll your head back toward your chest and to the left into the same position  Hold for 5 seconds  Gently roll your head back and around in a clockwise [place] 3 times   Next, move your head in the reverse direction (counterclockwise) in a [place] 3 times  Do not shrug your shoulders upwards while you do this exercise  Contact your healthcare provider if:   · Your pain does not get better, or gets worse  · You have questions or concerns about your condition, care, or exercise program     © Copyright [employer] 2022 Information is for End User's use only and may not be sold, redistributed or otherwise used for commercial purposes  All illustrations and images included in CareNotes® are the copyrighted property of [employer]  or Danilo Aj   The above information is an  only  It is not intended as medical advice for individual conditions or treatments  Talk to your doctor, nurse or pharmacist before following any medical regimen to see if it is safe and effective for you  Opioid Safety   AMBULATORY CARE:   Opioid safety  includes the correct use, storage, and disposal of opioids  Examples of opioid medicines to treat pain include oxycodone, morphine, fentanyl, and codeine  Call your local emergency number (911 in the [address] [facility] [facility]), or have someone else call if:   · You have a seizure  · You cannot be woken  · You have trouble staying awake and your breathing is slow or shallow  · Your speech is slurred, or you are confused  · You are dizzy or stumble when you walk  Call your doctor, or have someone close to you call if:   · You are extremely drowsy, or you have trouble staying awake or speaking  · You have pale or clammy skin  · You have blue fingernails or lips  · Your heartbeat is slower than normal     · You cannot stop vomiting  · You have questions or concerns about your condition or care  Use opioids safely:   · Take prescribed opioids exactly as directed  Opioids come with directions based on the kind of opioid and how it is given  Do not take more than the recommended amount, or for longer than needed      · Do not give opioids to others or take opioids that belong to someone else  Misuse of opioids can lead to an addiction or overdose  · Do not mix opioids with other medicines or alcohol  The combination can cause an overdose, or lead to a coma  · Do not drive or operate heavy machinery after you take the opioid  Your provider or pharmacist can tell you how long to wait after a dose before you do these activities  · Talk to your healthcare provider if you have any side effects  He or she can help you prevent or relieve side effects  Side effects include nausea, sleepiness, itching, and trouble thinking clearly  Manage constipation:  Constipation is the most common side effect of opioid medicine  Constipation is when you have hard, dry bowel movements, or you go longer than usual between bowel movements  Tell your healthcare provider about all changes in your bowel movements while you are taking opioids  He or she may recommend laxative medicine to help you have a bowel movement  He or she may also change the kind of opioid you are taking, or change when you take it  The following are more ways you can prevent or relieve constipation:  · Drink liquids as directed  You may need to drink extra liquids to help soften and move your bowels  Ask how much liquid to drink each day and which liquids are best for you  · Eat high-fiber foods  This may help decrease constipation by adding bulk to your bowel movements  High-fiber foods include fruits, vegetables, whole-grain breads and cereals, and beans  Your healthcare provider or dietitian can help you create a high-fiber meal plan  Your provider may also recommend a fiber supplement if you cannot get enough fiber from food  · Exercise regularly  Regular physical activity can help stimulate your intestines  Walking is a good exercise to prevent or relieve constipation  Ask which exercises are best for you  · Schedule a time each day to have a bowel movement    This may help train your body to have regular bowel movements  Bend forward while you are on the toilet to help move the bowel movement out  Sit on the toilet for at least 10 minutes, even if you do not have a bowel movement  Store opioids safely:   · Store opioids where others cannot easily get them  Keep them in a locked cabinet or secure area  Do not  keep them in a purse or other bag you carry with you  A person may be looking for something else and find the opioids  · Make sure opioids are stored out of the reach of children  A child can easily overdose on opioids  Opioids may look like candy to a small child  The best way to dispose of opioids: The laws vary by country and area  In the [place], the best way is to return the opioids through a take-back program  This program is offered by the [employer]  The following are options for using the program:  · Take the opioids to a PINA collection site  The site is often a law enforcement center  Call your local law enforcement center for scheduled take-back days in your area  You will be given information on where to go if the collection site is in a different location  · Take the opioids to an approved pharmacy or hospital   A pharmacy or hospital may be set up as a collection site  You will need to ask if it is a PINA collection site if you were not directed there  A pharmacy or doctor's office may not be able to take back opioids unless it is a PINA site  · Use a mail-back system  This means you are given containers to put the opioids into  You will then mail them in the containers  · Use a take-back drop box  This is a place to leave the opioids at any time  People and animals will not be able to get into the box  Your local law enforcement agency can tell you where to find a drop box in your area  Other safe ways to dispose of opioids: The medicine may come with disposal instructions   The instructions may vary depending on the brand of medicine you are using  Instructions may come in a Medication Guide, but not every medicine has one  You may instead get instructions from your pharmacy or doctor  Follow instructions carefully  The following are general guidelines to follow:  · Find out if you can flush the opioid  Some opioids can be flushed down the toilet or poured into the sink  You will need to contact authorities in your area to see if this is an option for you  The FDA also offers a list of medicines that are safe to flush down the toilet  You can check the list if you cannot get the information for your local area  · Ask your waste management company about rules for putting opioids in the trash  The company will be able to give you specific directions  Scratch out personal information on the original medicine label so it cannot be read  Then put it in the trash  Do not label the trash or put any information on it about the opioids  It should look like regular household trash so no one is tempted to look for the opioids  Keep the trash out of the reach of children and animals  Always make sure trash is secure  · Talk to officials if you live in a facility  If you live in a nursing home or assisted living center, talk to an official  The person will know the rules for your area  Other ways to manage pain:   · Ask your healthcare provider about non-opioid medicines to control pain  Nonprescription medicines include NSAIDs (such as ibuprofen) and acetaminophen  Prescription medicines include muscle relaxers, antidepressants, and steroids  · Pain may be managed without any medicines  Some ways to relieve pain include massage, aromatherapy, or meditation  Physical or occupational therapy may also help  For more information:   · Drug Enforcement Administration  [address] [place] [address] [address]  Phone: 8- 020 - 635-3169  Web Address: [email or web address]    · [address] and Drug Administration  [address] , [address] [place] [address]  Phone: 8- 232 - 802-5278  Web Address: http://[employer]/    Follow up with your doctor or pain specialist as directed: You may need to have your dose adjusted  Your doctor or pain specialist can also help you find ways to manage pain without opioids  Write down your questions so you remember to ask them during your visits  © Copyright [employer] 2022 Information is for End User's use only and may not be sold, redistributed or otherwise used for commercial purposes  All illustrations and images included in CareNotes® are the copyrighted property of [employer]  or [address] [place], [place] [address]toy javid   The above information is an  only  It is not intended as medical advice for individual conditions or treatments  Talk to your doctor, nurse or pharmacist before following any medical regimen to see if it is safe and effective for you

## 2022-03-14 NOTE — TELEPHONE ENCOUNTER
Called ups spoke to Lance,   Confirmation number [id number]   they will  by tomorrow 03/15/2022 @ 3:00pm

## 2022-03-15 ENCOUNTER — TELEPHONE (OUTPATIENT)
Dept: OBGYN CLINIC | Facility: CLINIC | Age: 57
End: 2022-03-15

## 2022-03-16 ENCOUNTER — PREP FOR PROCEDURE (OUTPATIENT)
Dept: PAIN MEDICINE | Facility: CLINIC | Age: 57
End: 2022-03-16

## 2022-03-16 DIAGNOSIS — M54.12 CERVICAL RADICULOPATHY: Primary | ICD-10-CM

## 2022-03-16 NOTE — TELEPHONE ENCOUNTER
Spoke to patient  Scheduled C7-T1 ILESI for 3/17/2022  Patient aware of instructions  No food/drink one hour before  Wear comfortable clothing  A  is required  Denies blood thinners  Continue medications  Call if prescribed an antibiotic  Refrain from vaccinations two weeks before and after injection  Call with questions

## 2022-03-17 ENCOUNTER — APPOINTMENT (OUTPATIENT)
Dept: RADIOLOGY | Facility: HOSPITAL | Age: 57
End: 2022-03-17
Payer: COMMERCIAL

## 2022-03-17 ENCOUNTER — HOSPITAL ENCOUNTER (OUTPATIENT)
Facility: HOSPITAL | Age: 57
Setting detail: OUTPATIENT SURGERY
Discharge: HOME | End: 2022-03-17
Attending: ANESTHESIOLOGY | Admitting: ANESTHESIOLOGY
Payer: COMMERCIAL

## 2022-03-17 VITALS
BODY MASS INDEX: 28.14 KG/M2 | SYSTOLIC BLOOD PRESSURE: 118 MMHG | DIASTOLIC BLOOD PRESSURE: 60 MMHG | TEMPERATURE: 98.1 F | HEART RATE: 70 BPM | RESPIRATION RATE: 18 BRPM | OXYGEN SATURATION: 99 % | HEIGHT: 69 IN | WEIGHT: 190 LBS

## 2022-03-17 LAB
6MAM UR QL CFM: NEGATIVE NG/ML
7AMINOCLONAZEPAM UR QL CFM: NEGATIVE NG/ML
A-OH ALPRAZ UR QL CFM: NEGATIVE NG/ML
ACCEPTABLE CREAT UR QL: NORMAL MG/DL
ACCEPTIBLE SP GR UR QL: NORMAL
AMPHET UR QL CFM: NEGATIVE NG/ML
AMPHET UR QL CFM: NEGATIVE NG/ML
BUPRENORPHINE UR QL CFM: NEGATIVE NG/ML
BZE UR QL CFM: NEGATIVE NG/ML
CARISOPRODOL UR QL CFM: NEGATIVE NG/ML
CODEINE UR QL CFM: NEGATIVE NG/ML
EDDP UR QL CFM: NEGATIVE NG/ML
ETHYL GLUCURONIDE UR QL SCN: NEGATIVE NG/ML
FENTANYL UR QL CFM: NEGATIVE NG/ML
GLIADIN IGG SER IA-ACNC: NEGATIVE NG/ML
HYDROCODONE UR QL CFM: NEGATIVE NG/ML
HYDROCODONE UR QL CFM: NEGATIVE NG/ML
HYDROMORPHONE UR QL CFM: NEGATIVE NG/ML
LORAZEPAM UR QL CFM: NEGATIVE NG/ML
MDMA UR QL CFM: NEGATIVE NG/ML
MEPROBAMATE UR QL CFM: NEGATIVE NG/ML
METHADONE UR QL CFM: NEGATIVE NG/ML
METHAMPHET UR QL CFM: NEGATIVE NG/ML
MORPHINE UR QL CFM: NEGATIVE NG/ML
MORPHINE UR QL CFM: NEGATIVE NG/ML
NITRITE UR QL: NORMAL UG/ML
NORBUPRENORPHINE UR QL CFM: NEGATIVE NG/ML
NORDIAZEPAM UR QL CFM: NEGATIVE NG/ML
NORFENTANYL UR QL CFM: NEGATIVE NG/ML
NORHYDROCODONE UR QL CFM: NEGATIVE NG/ML
NORHYDROCODONE UR QL CFM: NEGATIVE NG/ML
NOROXYCODONE UR QL CFM: ABNORMAL NG/ML
OXAZEPAM UR QL CFM: NEGATIVE NG/ML
OXYCODONE UR QL CFM: ABNORMAL NG/ML
OXYMORPHONE UR QL CFM: ABNORMAL NG/ML
OXYMORPHONE UR QL CFM: ABNORMAL NG/ML
RESULT ALL_PRESCRIBED MEDS AND SPECIAL INSTRUCTIONS: NORMAL
SL AMB 3-METHYL-FENTANYL QUANTIFICATION: NORMAL NG/ML
SL AMB 4-ANPP QUANTIFICATION: NORMAL NG/ML
SL AMB 4-FIBF QUANTIFICATION: NORMAL NG/ML
SL AMB 7-OH-MITRAGYNINE (KRATOM ALKALOID) QUANTIFICATION: NEGATIVE NG/ML
SL AMB ACETYL FENTANYL QUANTIFICATION: NORMAL NG/ML
SL AMB ACETYL NORFENTANYL QUANTIFICATION: NORMAL NG/ML
SL AMB ACRYL FENTANYL QUANTIFICATION: NORMAL NG/ML
SL AMB BUTRYL FENTANYL QUANTIFICATION: NORMAL NG/ML
SL AMB CARFENTANIL QUANTIFICATION: NORMAL NG/ML
SL AMB CITALOPRAM/ESCITALOPRAM QUANTIFICATION: NEGATIVE NG/ML
SL AMB CITALOPRAM/ESCITALOPRAM QUANTIFICATION: NEGATIVE NG/ML
SL AMB CTHC (MARIJUANA METABOLITE) QUANTIFICATION: NEGATIVE NG/ML
SL AMB CYCLOPROPYL FENTANYL QUANTIFICATION: NORMAL NG/ML
SL AMB FURANYL FENTANYL QUANTIFICATION: NORMAL NG/ML
SL AMB HYDROXYBUPROPION QUANTIFICATION: NEGATIVE NG/ML
SL AMB METHOXYACETYL FENTANYL QUANTIFICATION: NORMAL NG/ML
SL AMB N-DESMETHYL U-47700 QUANTIFICATION: NORMAL NG/ML
SL AMB N-DESMETHYL-TRAMADOL QUANTIFICATION: NEGATIVE NG/ML
SL AMB PREGABALIN QUANTIFICATION: NEGATIVE
SL AMB U-47700 QUANTIFICATION: NORMAL NG/ML
SPECIMEN PH ACCEPTABLE UR: NORMAL
TAPENTADOL UR QL CFM: NEGATIVE NG/ML
TEMAZEPAM UR QL CFM: NEGATIVE NG/ML
TEMAZEPAM UR QL CFM: NEGATIVE NG/ML
TRAMADOL UR QL CFM: NEGATIVE NG/ML
URATE/CREAT 24H UR: NEGATIVE NG/ML

## 2022-03-17 PROCEDURE — 62321 NJX INTERLAMINAR CRV/THRC: CPT | Performed by: ANESTHESIOLOGY

## 2022-03-17 RX ORDER — METHYLPREDNISOLONE ACETATE 80 MG/ML
INJECTION, SUSPENSION INTRA-ARTICULAR; INTRALESIONAL; INTRAMUSCULAR; SOFT TISSUE AS NEEDED
Status: DISCONTINUED | OUTPATIENT
Start: 2022-03-17 | End: 2022-03-17 | Stop reason: HOSPADM

## 2022-03-17 RX ORDER — ALPRAZOLAM 0.5 MG/1
0.5 TABLET ORAL ONCE
Status: COMPLETED | OUTPATIENT
Start: 2022-03-17 | End: 2022-03-17

## 2022-03-17 RX ORDER — LIDOCAINE HYDROCHLORIDE 10 MG/ML
INJECTION, SOLUTION EPIDURAL; INFILTRATION; INTRACAUDAL; PERINEURAL AS NEEDED
Status: DISCONTINUED | OUTPATIENT
Start: 2022-03-17 | End: 2022-03-17 | Stop reason: HOSPADM

## 2022-03-17 RX ORDER — SODIUM CHLORIDE 9 MG/ML
INJECTION INTRAVENOUS AS NEEDED
Status: DISCONTINUED | OUTPATIENT
Start: 2022-03-17 | End: 2022-03-17 | Stop reason: HOSPADM

## 2022-03-17 RX ADMIN — ALPRAZOLAM 0.5 MG: 0.5 TABLET ORAL at 10:36

## 2022-03-17 NOTE — PROCEDURES
Pre-procedure Diagnosis: Cervical Radiculopathy  Post-procedure Diagnosis: Cervical Radiculopathy  Procedure Title(s):  1  C7-T1 interlaminar epidural steroid injection      2  Intraoperative fluoroscopy  Attending Surgeon:   Shanice Multani MD  Anesthesia:   Local     Indications: The patient is a 64y o  year-old male with a diagnosis of Cervical Radiculopathy  The patient's history and physical exam were reviewed  The risks, benefits and alternatives to the procedure were discussed, and all questions were answered to the patient's satisfaction  The patient agreed to proceed, and written informed consent was obtained  Procedure in Detail: The patient was brought into the procedure room and placed in the prone position on the fluoroscopy table  The area of the cervical spine was prepped with chlorhexidine gluconate solution times one and draped in a sterile manner  The C7-T1 interspace was identified and marked under AP fluoroscopy  The skin and subcutaneous tissues in the area were anesthetized with 1% lidocaine  A 18-gauge Tuohy epidural needle was directed toward the interspace under fluoroscopic guidance until the ligamentum flavum was engaged  The C-arm was oblique to the right to obtain a contra-lateral oblique view  From this point, a loss of resistance technique with saline was used to identify entrance of the needle into the epidural space  Once an appropriate loss was obtained, negative aspiration was confirmed, and 2 ml Omnipaque 240 contrast solution was injected  An appropriate epidurogram was noted  Then, after negative aspiration, a solution consisting of 1-mL depo-medrol (80mg/mL) and 2-mL preservative-free saline was easily injected  The needle was removed with a 1% lidocaine flush  The patient's back was cleaned and a bandage was placed over the site of needle insertion  Disposition: The patient tolerated the procedure well, and there were no apparent complications   The patient was taken to the recovery area where written discharge instructions for the procedure were given       Estimated Blood Loss: None  Specimens Obtained: N/A

## 2022-03-17 NOTE — OP NOTE
OPERATIVE REPORT  PATIENT NAME: Samuel Owens    :  1965  MRN: [id number]  Pt Location:  GI ROOM 01    SURGERY DATE: 3/17/2022    Surgeon(s) and Role:      Vance Kan MD - Primary    Preop Diagnosis:  Cervical radiculopathy [M54 12]    Post-Op Diagnosis Codes:     * Cervical radiculopathy [M54 12]    Procedure(s) (LRB):  C7-T1 ILESI or alt level (N/A)    Specimen(s):  * No specimens in log *    Estimated Blood Loss:   Minimal    Drains:  * No LDAs found *    Anesthesia Type:   Local    Operative Indications:  Cervical radiculopathy [M54 12]    Operative Findings:  C7-T1 epidurogram    Complications:   None    Procedure and Technique:  Please see detailed procedure note     I was present for the entire procedure    Patient Disposition:  PACU       SIGNATURE: Isabelle uRiz MD  DATE: 2022  TIME: 10:56 AM

## 2022-03-17 NOTE — INTERVAL H&P NOTE
H&P reviewed  After examining the patient I find no changes in the patients condition since the H&P had been written      Vitals:    03/17/22 1032   BP: 124/68   Pulse: 71   Resp: 20   Temp: 97 9 °F (36 6 °C)   SpO2: 97%

## 2022-03-17 NOTE — DISCHARGE INSTRUCTIONS
PLEASE SCHEDULE 2 WEEK FOLLOW UP BY CALLING THE [facility] AT [place]: [phone]      Epidural Steroid Injection   AMBULATORY CARE:   What you need to know about an epidural steroid injection (JALEN):  An JALEN is a procedure to inject steroid medicine into the epidural space  The epidural space is between your spinal cord and vertebrae  Steroids reduce inflammation and fluid buildup in your spine that may be causing pain  You may be given pain medicine along with the steroids  How to prepare for an JALEN:  Your healthcare provider will talk to you about how to prepare for your procedure  He or she will tell you what medicines to take or not take on the day of your procedure  You may need to stop taking blood thinners or other medicines several days before your procedure  You may need to adjust any diabetes medicine you take on the day of your procedure  Steroid medicine can increase your blood sugar level  Arrange for someone to drive you home when you are discharged  What will happen during an JALEN:   · You will be given medicine to numb the procedure area  You will be awake for the procedure, but you will not feel pain  You may also be given medicine to help you relax  Contrast liquid will be used to help your healthcare provider see the area better  Tell the healthcare provider if you have ever had an allergic reaction to contrast liquid  · Your healthcare provider may place the needle into your neck area, middle of your back, or tailbone area  He may inject the medicine next to the nerves that are causing your pain  He may instead inject the medicine into a larger area of the epidural space  This helps the medicine spread to more nerves  Your healthcare provider will use a fluoroscope to help guide the needle to the right place  A fluoroscope is a type of x-ray   After the procedure, a bandage will be placed over the injection site to prevent infection  What will happen after an JALEN:  You will have a bandage over the injection site to prevent infection  Your healthcare provider will tell you when you can bathe and any activity guidelines  You will be able to go home  Risks of an JALEN:  You may have temporary or permanent nerve damage or paralysis  You may have bleeding or develop a serious infection, such as meningitis (swelling of the brain coverings)  An abscess may also develop  An abscess is a pus-filled area under the skin  You may need surgery to fix the abscess  You may have a seizure, anxiety, or trouble sleeping  If you are a man, you may have temporary erectile dysfunction (not able to have an erection)  Call your local emergency number (911 in the [address] [facility]) if:   · You have a seizure  · You have trouble moving your legs  Seek care immediately if:   · Blood soaks through your bandage  · You have a fever or chills, severe back pain, and the procedure area is sensitive to the touch  · You cannot control when you urinate or have a bowel movement  Call your doctor if:   · You have weakness or numbness in your legs  · Your wound is red, swollen, or draining pus  · You have nausea or are vomiting  · Your face or neck is red and you feel warm  · You have more pain than you had before the procedure  · You have swelling in your hands or feet  · You have questions or concerns about your condition or care  Care for your wound as directed: You may remove the bandage before you go to bed the day of your procedure  You may take a shower, but do not take a bath for at least 24 hours  Self-care:   · Do not drive,  use machines, or do strenuous activity for 24 hours after your procedure or as directed  · Continue other treatments  as directed  Steroid injections alone will not control your pain  The injections are meant to be used with other treatments, such as physical therapy      Follow up with your doctor as directed:  Write down your questions so you remember to ask them during your visits  © Copyright [employer] 2022 Information is for End User's use only and may not be sold, redistributed or otherwise used for commercial purposes  All illustrations and images included in CareNotes® are the copyrighted property of [employer] M [employer]  or Danilo Noriega  The above information is an  only  It is not intended as medical advice for individual conditions or treatments  Talk to your doctor, nurse or pharmacist before following any medical regimen to see if it is safe and effective for you

## 2022-03-24 ENCOUNTER — TELEPHONE (OUTPATIENT)
Dept: PAIN MEDICINE | Facility: CLINIC | Age: 57
End: 2022-03-24

## 2022-04-04 ENCOUNTER — OFFICE VISIT (OUTPATIENT)
Dept: PAIN MEDICINE | Facility: CLINIC | Age: 57
End: 2022-04-04
Payer: COMMERCIAL

## 2022-04-04 VITALS
TEMPERATURE: 98.2 F | SYSTOLIC BLOOD PRESSURE: 98 MMHG | BODY MASS INDEX: 28.47 KG/M2 | HEART RATE: 81 BPM | WEIGHT: 192.2 LBS | RESPIRATION RATE: 20 BRPM | DIASTOLIC BLOOD PRESSURE: 52 MMHG | HEIGHT: 69 IN

## 2022-04-04 DIAGNOSIS — K21.9 GASTROESOPHAGEAL REFLUX DISEASE WITHOUT ESOPHAGITIS: ICD-10-CM

## 2022-04-04 DIAGNOSIS — M54.12 CERVICAL RADICULOPATHY: Primary | ICD-10-CM

## 2022-04-04 PROCEDURE — 99214 OFFICE O/P EST MOD 30 MIN: CPT | Performed by: ANESTHESIOLOGY

## 2022-04-04 RX ORDER — OMEPRAZOLE 20 MG/1
CAPSULE, DELAYED RELEASE ORAL
Qty: 30 CAPSULE | Refills: 2 | Status: SHIPPED | OUTPATIENT
Start: 2022-04-04 | End: 2022-07-06 | Stop reason: SDUPTHER

## 2022-04-04 RX ORDER — CELECOXIB 200 MG/1
200 CAPSULE ORAL 2 TIMES DAILY
Qty: 60 CAPSULE | Refills: 2 | Status: SHIPPED | OUTPATIENT
Start: 2022-04-04 | End: 2022-06-27

## 2022-04-04 RX ORDER — PREGABALIN 100 MG/1
100 CAPSULE ORAL 3 TIMES DAILY
Qty: 90 CAPSULE | Refills: 2 | Status: SHIPPED | OUTPATIENT
Start: 2022-04-04 | End: 2022-07-06 | Stop reason: SDUPTHER

## 2022-04-04 RX ORDER — GABAPENTIN 300 MG/1
300 CAPSULE ORAL 3 TIMES DAILY
COMMUNITY
Start: 2022-03-14 | End: 2022-04-04

## 2022-04-04 RX ORDER — OMEPRAZOLE 20 MG/1
20 CAPSULE, DELAYED RELEASE ORAL DAILY
Qty: 30 CAPSULE | Refills: 2 | Status: SHIPPED | OUTPATIENT
Start: 2022-04-04 | End: 2022-04-04

## 2022-04-04 NOTE — PROGRESS NOTES
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 with marked left transforaminal stenosis  Risks, benefits and alternatives discussed with regard to opioid therapy, repeat injection and referral to [address] [place] [address] for possible acdf, decompression; has followed up spine surgeon, deferring surgery til exhaustion of conservative treatment efforts  Previously reported the following symptomatology:     Left-sided neck pain described primarily radicular features into the C5 and C6 dermatomal distribution accompanied by pain limited weakness numbness and paresthesias  +spurling's maneuver left sided; ttp over cervical paraspinal muscles, pain with cervical facet loading bilaterally, left greater than right  MRI from 2014 reviewed which shows moderate broad-based posterior disc protrusion at C5-C6  Superimposed spondylosis and facet degenerative changes resulting in moderate canal stenosis and marked left and moderate right neural foraminal narrowing  Additionally C6-C7 there is moderate broad-based disc protrusion asymmetrically increased to the left lateral reason at C6-C7  There is findings suspicious for some degree of neural compression  Superimposed spondylosis and changes resulting in mild canal narrowing  Marked left and mild right neural foraminal stenosis    Reasonable at this time to proceed with multimodal pain therapy plan focusing on physical therapy in conjunction with medications and epidural steroid injection to target radicular pain  Plan  -C7-T1 ILESI or alternate level deferred for now  -opioid use agreement previously signed by both parties; uds previously obtained; had been taking old percocet; risks discussed below; percocet 5-325mg [id number] prn moderate to severe pain   -gabapentin 600mg TID transitioned to lyrica 100mg TID; counseled regarding sedative effects of taking this medication and provided up titration calendar  Counseled not to take medication while driving or operating heavy machinery/using stairs  -celebrex  Increased to 200 mg b i d  prn pain previously prescribed  Patient educated regarding bleeding risk of taking this medication not taking any other nonsteroidal anti-inflammatory medications while taking this medication; counseled thoroughly regarding potential risk of Cardiovascular injury, Kidney injury, Gastrointestinal ulceration/bleeding  Patient voiced understanding; GI ppx written omeprazole 20mg/day  -physical therapy for left sided cervical radiculopathy; Physician directed home exercise plan as per [facility] demonstrated and handouts provided that patient plans to participate with for 1 hour, twice a week for the next 6 weeks  -f/u 3 months or sooner if pain uncontrolled    There are risks associated with opioid medications, including dependence, addiction and tolerance  The patient understands and agrees to use these medications only as prescribed  Potential side effects of the medications include, but are not limited to, constipation, drowsiness, addiction, impaired judgment and risk of fatal overdose if not taken as prescribed  The patient was warned against driving while taking sedation medications  Sharing medications is a felony  At this point in time, the patient is showing no signs of addiction, abuse, diversion or suicidal ideation       [facility] Prescription Drug Monitoring Program report was reviewed and was appropriate      Complete risks and benefits including bleeding, infection, tissue reaction, nerve injury and allergic reaction were discussed  The approach was demonstrated using models and literature was provided  Verbal and written consent was obtained  My impressions and treatment recommendations were discussed in detail with the patient who verbalized understanding and had no further questions  Discharge instructions were provided  I personally saw and examined the patient and I agree with the above discussed plan of care  My impressions and treatment recommendations were discussed in detail with the patient who verbalized understanding and had no further questions  Discharge instructions were provided  I personally saw and examined the patient and I agree with the above discussed plan of care  New Medications Ordered This Visit   Medications    celecoxib (CeleBREX) 200 mg capsule     Sig: Take 1 capsule (200 mg total) by mouth 2 (two) times a day     Dispense:  60 capsule     Refill:  2    pregabalin (LYRICA) 100 mg capsule     Sig: Take 1 capsule (100 mg total) by mouth 3 (three) times a day     Dispense:  90 capsule     Refill:  2       History of Present Illness    No significant relief of pain or improved ability to participate with IADLs after repeat ILESI at C7-T1; Symptoms have recurred and patient is in debilitating pain  New MRI cervical spine noncontrast shows moderate 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 with marked left transforaminal stenosis  Risks, benefits and alternatives discussed with regard to opioid therapy, repeat injection and referral to [address] [place] [address] for possible acdf, decompression; has followed up spine surgeon, deferring surgery til exhaustion of conservative treatment efforts  Previously reported the following symptomatology:     Tri Neville is a 64 y o  male with pmhx of XOL presenting with a past medical history of left-sided neck pain described primarily as radicular nature    The pain radiates in the C5 and C6 dermatomal distributions and is primarily left-sided  Patient had an acute exacerbation of the pain over the past few days after engaging in strenuous overhead maneuvers  The pain contributes to significant disability in participation with independent activities of daily living and is accompanied by weakness numbness and paresthesias that are debilitating in nature  The patient describes that overhead maneuvers such as combing hair is significantly limiting with respect to strength in the left arm/hand  The patient notes significant pain limited weakness with  left hand  as well  The patient has not been to physical therapy but was recently seen in ED where imaging of cervical spine showed at least moderate spondylosis and disc degeneration at C4-C7  He has trialed conservative measures including celebrex and flexeril for the pain but has not trialed any steroids  He has never had interventional pain procedures in the past including any cervical interlaminar epidural steroid injections in the past for his pain  I have personally reviewed and/or updated the patient's past medical history, past surgical history, family history, social history, current medications, allergies, and vital signs today  Review of Systems   Constitutional: Positive for activity change  HENT: Negative  Eyes: Negative  Respiratory: Negative  Cardiovascular: Negative  Gastrointestinal: Negative  Endocrine: Negative  Genitourinary: Negative  Musculoskeletal: Positive for arthralgias, myalgias, neck pain and neck stiffness  Skin: Negative  Allergic/Immunologic: Negative  Neurological: Positive for weakness and numbness  Hematological: Negative  Psychiatric/Behavioral: Negative  All other systems reviewed and are negative        Patient Active Problem List   Diagnosis    Cervical radiculopathy    Gastroesophageal reflux disease without esophagitis       Past Medical History:   Diagnosis Date    Herniated disc, cervical        Past Surgical History:   Procedure Laterality Date    EPIDURAL BLOCK INJECTION N/A 2/17/2022    Procedure: BLOCK / INJECTION EPIDURAL STEROID CERVICAL C7-T1;  Surgeon: Xin Torres MD;  Location: [facility];  Service: Pain Management     EPIDURAL BLOCK INJECTION N/A 3/17/2022    Procedure: C7-T1 ILESI or alt level;  Surgeon: Xin Torres MD;  Location: [facility];  Service: Pain Management     FL GUIDED NEEDLE PLAC BX/ASP/INJ  3/17/2022    JOINT REPLACEMENT      knee bilateral     REPLACEMENT TOTAL KNEE BILATERAL  2018 2019       Family History   Problem Relation Age of Onset    Arthritis Mother     Diabetes Father        Social History     Occupational History    Not on file   Tobacco Use    Smoking status: Never Smoker    Smokeless tobacco: Never Used   Vaping Use    Vaping Use: Never used   Substance and Sexual Activity    Alcohol use: Never     Comment: rarely     Drug use: Never    Sexual activity: Not on file       Current Outpatient Medications on File Prior to Visit   Medication Sig    Magnesium Hydroxide (MAGNESIA PO) Take by mouth    Omega-3 Fatty Acids (FISH OIL PO) Take by mouth    rosuvastatin (Crestor) 10 MG tablet Take by mouth    [DISCONTINUED] gabapentin (NEURONTIN) 300 mg capsule Take 300 mg by mouth 3 (three) times a day    [DISCONTINUED] gabapentin (Neurontin) 600 MG tablet Take 1 tablet (600 mg total) by mouth 3 (three) times a day    [DISCONTINUED] celecoxib (CeleBREX) 100 mg capsule Take 1 capsule (100 mg total) by mouth 2 (two) times a day as needed for mild pain    [DISCONTINUED] methylPREDNISolone 4 MG tablet therapy pack Use as directed on package    [DISCONTINUED] oxyCODONE-acetaminophen (Percocet) 5-325 mg per tablet Take 1 tablet by mouth every 6 (six) hours as needed for moderate pain Max Daily Amount: 4 tablets     No current facility-administered medications on file prior to visit  No Known Allergies      Physical Exam    BP 98/52   Pulse 81   Temp 98 2 °F (36 8 °C)   Resp 20   Ht 5' 9" (1 753 m)   Wt 87 2 kg (192 lb 3 2 oz)   BMI 28 38 kg/m²     Constitutional: normal, well developed, well nourished, alert, in no distress and non-toxic and no overt pain behavior  Eyes: anicteric  HEENT: grossly intact  Neck: supple, symmetric, trachea midline and no masses   Pulmonary:even and unlabored  Cardiovascular:No edema or pitting edema present  Skin:Normal without rashes or lesions and well hydrated  Psychiatric:Mood and affect appropriate  Neurologic:Cranial Nerves II-XII grossly intact Sensation grossly intact; no clonus negative kern's  Reflexes 2+ and brisk  Spurling's maneuver positive left sided  Musculoskeletal:normal gait  5/5 strength bilaterally with AROM in all extremities  signficant ain with cervical facet loading bilaterally and with lateral spine rotation  ttp over cervical paraspinal muscles  Imaging    CERVICAL SPINE     INDICATION:   neck pain      COMPARISON:  None     VIEWS:  XR SPINE CERVICAL 2 OR 3 [facility] INJURY   Images: 3     FINDINGS:     No fracture or subluxation       Straightening of the usual lordosis      Moderate multilevel spondylitic changes C4-C7       The prevertebral soft tissues are within normal limits        The lung apices are clear      IMPRESSION:     No acute osseous abnormality      Degenerative changes as above       MRI from 2014 reviewed which shows moderate broad-based posterior disc protrusion at C5-C6  Superimposed spondylosis and facet degenerative changes resulting in moderate canal stenosis and marked left and moderate right neural foraminal narrowing  Additionally C6-C7 there is moderate broad-based disc protrusion asymmetrically increased to the left lateral reason at C6-C7  There is findings suspicious for some degree of neural compression  Superimposed spondylosis and changes resulting in mild canal narrowing    Marked left and mild right neural foraminal stenosis

## 2022-04-04 NOTE — PATIENT INSTRUCTIONS
Neck Exercises   AMBULATORY CARE:   Neck exercises  help reduce neck pain, and improve neck movement and strength  Neck exercises also help prevent long-term neck problems  What you need to know about neck exercises:   · Do the exercises every day,  or as often as directed by your healthcare provider  · Move slowly, gently, and smoothly  Avoid fast or jerky motions  · Stand and sit the way your healthcare provider shows you  Good posture may reduce your neck pain  Check your posture often, even when you are not doing your neck exercises  How to perform neck exercises safely:   · Exercise position:  You may sit or stand while you do neck exercises  Face forward  Your shoulders should be straight and relaxed, with a good posture  · Head tilts, forward and back:  Gently bow your head and try to touch your chin to your chest  Your healthcare provider may tell you to push on the back of your neck to help bow your head  Raise your chin back to the starting position  Tilt your head back as far as possible so you are looking up at the ceiling  Your healthcare provider may tell you to lift your chin to help tilt your head back  Return your head to the starting position  · Head tilts, side to side:  Tilt your head, bringing your ear toward your shoulder  Then tilt your head toward the other shoulder  · Head turns:  Turn your head to look over your shoulder  Tilt your chin down and try to touch it to your shoulder  Do not raise your shoulder to your chin  Face forward again  Do the same on the other side  · Head rolls:  Slowly bring your chin toward your chest  Next, roll your head to the right  Your ear should be positioned over your shoulder  Hold this position for 5 seconds  Roll your head back toward your chest and to the left into the same position  Hold for 5 seconds  Gently roll your head back and around in a clockwise [place] 3 times   Next, move your head in the reverse direction (counterclockwise) in a [place] 3 times  Do not shrug your shoulders upwards while you do this exercise  Contact your healthcare provider if:   · Your pain does not get better, or gets worse  · You have questions or concerns about your condition, care, or exercise program     © Copyright [employer] 2022 Information is for End User's use only and may not be sold, redistributed or otherwise used for commercial purposes  All illustrations and images included in CareNotes® are the copyrighted property of [employer]  or [facility] [employer]anni [employer]   The above information is an  only  It is not intended as medical advice for individual conditions or treatments  Talk to your doctor, nurse or pharmacist before following any medical regimen to see if it is safe and effective for you

## 2022-06-27 DIAGNOSIS — M54.12 CERVICAL RADICULOPATHY: ICD-10-CM

## 2022-06-27 RX ORDER — CELECOXIB 200 MG/1
CAPSULE ORAL
Qty: 60 CAPSULE | Refills: 2 | Status: SHIPPED | OUTPATIENT
Start: 2022-06-27 | End: 2022-07-06 | Stop reason: SDUPTHER

## 2022-07-06 ENCOUNTER — OFFICE VISIT (OUTPATIENT)
Dept: PAIN MEDICINE | Facility: CLINIC | Age: 57
End: 2022-07-06
Payer: COMMERCIAL

## 2022-07-06 VITALS
HEART RATE: 63 BPM | DIASTOLIC BLOOD PRESSURE: 68 MMHG | BODY MASS INDEX: 28.02 KG/M2 | TEMPERATURE: 97.1 F | SYSTOLIC BLOOD PRESSURE: 108 MMHG | RESPIRATION RATE: 20 BRPM | HEIGHT: 69 IN | WEIGHT: 189.2 LBS

## 2022-07-06 DIAGNOSIS — K21.9 GASTROESOPHAGEAL REFLUX DISEASE WITHOUT ESOPHAGITIS: ICD-10-CM

## 2022-07-06 DIAGNOSIS — M54.12 CERVICAL RADICULOPATHY: ICD-10-CM

## 2022-07-06 PROCEDURE — 99214 OFFICE O/P EST MOD 30 MIN: CPT | Performed by: ANESTHESIOLOGY

## 2022-07-06 RX ORDER — GABAPENTIN 300 MG/1
300 CAPSULE ORAL 3 TIMES DAILY
COMMUNITY
Start: 2022-06-27 | End: 2022-07-06

## 2022-07-06 RX ORDER — CELECOXIB 200 MG/1
200 CAPSULE ORAL 2 TIMES DAILY
Qty: 180 CAPSULE | Refills: 2 | Status: SHIPPED | OUTPATIENT
Start: 2022-07-06

## 2022-07-06 RX ORDER — OMEPRAZOLE 20 MG/1
CAPSULE, DELAYED RELEASE ORAL
Qty: 90 CAPSULE | Refills: 2 | Status: SHIPPED | OUTPATIENT
Start: 2022-07-06

## 2022-07-06 RX ORDER — OMEPRAZOLE 20 MG/1
20 CAPSULE, DELAYED RELEASE ORAL DAILY
Qty: 90 CAPSULE | Refills: 2 | Status: SHIPPED | OUTPATIENT
Start: 2022-07-06 | End: 2022-07-06

## 2022-07-06 RX ORDER — PREGABALIN 100 MG/1
100 CAPSULE ORAL 3 TIMES DAILY
Qty: 270 CAPSULE | Refills: 2 | Status: SHIPPED | OUTPATIENT
Start: 2022-07-06

## 2022-07-06 NOTE — PROGRESS NOTES
Assessment  1  Cervical radiculopathy  -     celecoxib (CeleBREX) 200 mg capsule; Take 1 capsule (200 mg total) by mouth 2 (two) times a day  -     pregabalin (LYRICA) 100 mg capsule; Take 1 capsule (100 mg total) by mouth 3 (three) times a day    2  Gastroesophageal reflux disease without esophagitis  -     omeprazole (PriLOSEC) 20 mg delayed release capsule; Take 1 capsule (20 mg total) by mouth daily    Series of epidural steroid injections did not signfiicantly reduce patient's pain long term  Noting significant benefit with lyrica and celebrex  MRI cervical spine noncontrast shows moderate 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 with marked left transforaminal stenosis  Risks, benefits and alternatives discussed with regard to medications, prn repeat injection; deferring surgery til exhaustion of conservative treatment efforts for now  Previously reported the following symptomatology:     Left-sided neck pain described primarily radicular features into the C5 and C6 dermatomal distribution accompanied by pain limited weakness numbness and paresthesias  +spurling's maneuver left sided; ttp over cervical paraspinal muscles, pain with cervical facet loading bilaterally, left greater than right  MRI from 2014 reviewed which shows moderate broad-based posterior disc protrusion at C5-C6  Superimposed spondylosis and facet degenerative changes resulting in moderate canal stenosis and marked left and moderate right neural foraminal narrowing  Additionally C6-C7 there is moderate broad-based disc protrusion asymmetrically increased to the left lateral reason at C6-C7  There is findings suspicious for some degree of neural compression  Superimposed spondylosis and changes resulting in mild canal narrowing  Marked left and mild right neural foraminal stenosis    Reasonable at this time to proceed with multimodal pain therapy plan focusing on physical therapy in conjunction with medications and epidural steroid injection to target radicular pain  Plan  -continue lyrica 100mg TID; counseled regarding sedative effects of taking this medication and provided up titration calendar  Counseled not to take medication while driving or operating heavy machinery/using stairs  -celebrex 200 mg b i d  prn pain previously prescribed  Patient educated regarding bleeding risk of taking this medication not taking any other nonsteroidal anti-inflammatory medications while taking this medication; counseled thoroughly regarding potential risk of Cardiovascular injury, Kidney injury, Gastrointestinal ulceration/bleeding  Patient voiced understanding; GI ppx written omeprazole 20mg/day  -physical therapy for left sided cervical radiculopathy; Physician directed home exercise plan as per [facility] demonstrated and handouts provided that patient plans to participate with for 1 hour, twice a week for the next 6 weeks  -f/u 3 months or sooner if pain uncontrolled  -counseled to f/u with pcp for renal labs    There are risks associated with opioid medications, including dependence, addiction and tolerance  The patient understands and agrees to use these medications only as prescribed  Potential side effects of the medications include, but are not limited to, constipation, drowsiness, addiction, impaired judgment and risk of fatal overdose if not taken as prescribed  The patient was warned against driving while taking sedation medications  Sharing medications is a felony  At this point in time, the patient is showing no signs of addiction, abuse, diversion or suicidal ideation  [place] Prescription Drug Monitoring Program report was reviewed and was appropriate      Complete risks and benefits including bleeding, infection, tissue reaction, nerve injury and allergic reaction were discussed  The approach was demonstrated using models and literature was provided  Verbal and written consent was obtained       My impressions and treatment recommendations were discussed in detail with the patient who verbalized understanding and had no further questions  Discharge instructions were provided  I personally saw and examined the patient and I agree with the above discussed plan of care  My impressions and treatment recommendations were discussed in detail with the patient who verbalized understanding and had no further questions  Discharge instructions were provided  I personally saw and examined the patient and I agree with the above discussed plan of care  New Medications Ordered This Visit   Medications    celecoxib (CeleBREX) 200 mg capsule     Sig: Take 1 capsule (200 mg total) by mouth 2 (two) times a day     Dispense:  180 capsule     Refill:  2    pregabalin (LYRICA) 100 mg capsule     Sig: Take 1 capsule (100 mg total) by mouth 3 (three) times a day     Dispense:  270 capsule     Refill:  2    omeprazole (PriLOSEC) 20 mg delayed release capsule     Sig: Take 1 capsule (20 mg total) by mouth daily     Dispense:  90 capsule     Refill:  2       History of Present Illness    Series of epidural steroid injections did not signfiicantly reduce patient's pain long term  Noting significant benefit with lyrica and celebrex  MRI cervical spine noncontrast shows moderate 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 with marked left transforaminal stenosis  Risks, benefits and alternatives discussed with regard to medications, prn repeat injection; deferring surgery til exhaustion of conservative treatment efforts for now  Previously reported the following symptomatology:     Kimberley Sutton is a 64 y o  male with pmhx of XOL presenting with a past medical history of left-sided neck pain described primarily as radicular nature  The pain radiates in the C5 and C6 dermatomal distributions and is primarily left-sided    Patient had an acute exacerbation of the pain over the past few days after engaging in strenuous overhead maneuvers  The pain contributes to significant disability in participation with independent activities of daily living and is accompanied by weakness numbness and paresthesias that are debilitating in nature  The patient describes that overhead maneuvers such as combing hair is significantly limiting with respect to strength in the left arm/hand  The patient notes significant pain limited weakness with  left hand  as well  The patient has not been to physical therapy but was recently seen in ED where imaging of cervical spine showed at least moderate spondylosis and disc degeneration at C4-C7  He has trialed conservative measures including celebrex and flexeril for the pain but has not trialed any steroids  He has never had interventional pain procedures in the past including any cervical interlaminar epidural steroid injections in the past for his pain  I have personally reviewed and/or updated the patient's past medical history, past surgical history, family history, social history, current medications, allergies, and vital signs today  Review of Systems   Constitutional: Positive for activity change  HENT: Negative  Eyes: Negative  Respiratory: Negative  Cardiovascular: Negative  Gastrointestinal: Negative  Endocrine: Negative  Genitourinary: Negative  Musculoskeletal: Positive for arthralgias, myalgias, neck pain and neck stiffness  Skin: Negative  Allergic/Immunologic: Negative  Neurological: Positive for weakness and numbness  Hematological: Negative  Psychiatric/Behavioral: Negative  All other systems reviewed and are negative        Patient Active Problem List   Diagnosis    Cervical radiculopathy    Gastroesophageal reflux disease without esophagitis       Past Medical History:   Diagnosis Date    Herniated disc, cervical        Past Surgical History:   Procedure Laterality Date    EPIDURAL BLOCK INJECTION N/A 2/17/2022    Procedure: BLOCK / INJECTION EPIDURAL STEROID CERVICAL C7-T1;  Surgeon: Walden Buerger, MD;  [facility];  Service: Pain Management     EPIDURAL BLOCK INJECTION N/A 3/17/2022    Procedure: C7-T1 ILESI or alt level;  Surgeon: Walden Buerger, MD;  [facility];  Service: Pain Management     FL GUIDED NEEDLE PLAC BX/ASP/INJ  3/17/2022    JOINT REPLACEMENT      knee bilateral     REPLACEMENT TOTAL KNEE BILATERAL  2018 2019       Family History   Problem Relation Age of Onset    Arthritis Mother     Diabetes Father        Social History     Occupational History    Not on file   Tobacco Use    Smoking status: Never Smoker    Smokeless tobacco: Never Used   Vaping Use    Vaping Use: Never used   Substance and Sexual Activity    Alcohol use: Never     Comment: rarely     Drug use: Never    Sexual activity: Not on file       Current Outpatient Medications on File Prior to Visit   Medication Sig    Magnesium Hydroxide (MAGNESIA PO) Take by mouth    Omega-3 Fatty Acids (FISH OIL PO) Take by mouth    rosuvastatin (CRESTOR) 10 MG tablet Take by mouth    [DISCONTINUED] celecoxib (CeleBREX) 200 mg capsule TAKE 1 CAPSULE BY MOUTH TWICE A DAY    [DISCONTINUED] omeprazole (PriLOSEC) 20 mg delayed release capsule TAKE 1 CAPSULE BY MOUTH EVERY DAY    [DISCONTINUED] pregabalin (LYRICA) 100 mg capsule Take 1 capsule (100 mg total) by mouth 3 (three) times a day    [DISCONTINUED] gabapentin (NEURONTIN) 300 mg capsule Take 300 mg by mouth 3 (three) times a day     No current facility-administered medications on file prior to visit  No Known Allergies      Physical Exam    /68   Pulse 63   Temp (!) 97 1 °F (36 2 °C)   Resp 20   Ht 5' 9" (1 753 m)   Wt 85 8 kg (189 lb 3 2 oz)   BMI 27 94 kg/m²     Constitutional: normal, well developed, well nourished, alert, in no distress and non-toxic and no overt pain behavior    Eyes: anicteric  HEENT: grossly intact  Neck: supple, symmetric, trachea midline and no masses   Pulmonary:even and unlabored  Cardiovascular:No edema or pitting edema present  Skin:Normal without rashes or lesions and well hydrated  Psychiatric:Mood and affect appropriate  Neurologic:Cranial Nerves II-XII grossly intact Sensation grossly intact; no clonus negative kern's  Reflexes 2+ and brisk  Spurling's maneuver positive left sided  Musculoskeletal:normal gait  5/5 strength bilaterally with AROM in all extremities  signficant ain with cervical facet loading bilaterally and with lateral spine rotation  ttp over cervical paraspinal muscles  Imaging    CERVICAL SPINE     INDICATION:   neck pain      COMPARISON:  None     VIEWS:  XR SPINE CERVICAL 2 OR 3 VW INJURY   Images: 3     FINDINGS:     No fracture or subluxation       Straightening of the usual lordosis      Moderate multilevel spondylitic changes C4-C7       The prevertebral soft tissues are within normal limits        The lung apices are clear      IMPRESSION:     No acute osseous abnormality      Degenerative changes as above       MRI from 2014 reviewed which shows moderate broad-based posterior disc protrusion at C5-C6  Superimposed spondylosis and facet degenerative changes resulting in moderate canal stenosis and marked left and moderate right neural foraminal narrowing  Additionally C6-C7 there is moderate broad-based disc protrusion asymmetrically increased to the left lateral reason at C6-C7  There is findings suspicious for some degree of neural compression  Superimposed spondylosis and changes resulting in mild canal narrowing  Marked left and mild right neural foraminal stenosis

## 2022-07-06 NOTE — PATIENT INSTRUCTIONS
Neck Exercises   AMBULATORY CARE:   Neck exercises  help reduce neck pain, and improve neck movement and strength  Neck exercises also help prevent long-term neck problems  What you need to know about neck exercises:   Do the exercises every day,  or as often as directed by your healthcare provider  Move slowly, gently, and smoothly  Avoid fast or jerky motions  Stand and sit the way your healthcare provider shows you  Good posture may reduce your neck pain  Check your posture often, even when you are not doing your neck exercises  How to perform neck exercises safely:   Exercise position:  You may sit or stand while you do neck exercises  Face forward  Your shoulders should be straight and relaxed, with a good posture  Head tilts, forward and back:  Gently bow your head and try to touch your chin to your chest  Your healthcare provider may tell you to push on the back of your neck to help bow your head  Raise your chin back to the starting position  Tilt your head back as far as possible so you are looking up at the ceiling  Your healthcare provider may tell you to lift your chin to help tilt your head back  Return your head to the starting position  Head tilts, side to side:  Tilt your head, bringing your ear toward your shoulder  Then tilt your head toward the other shoulder  Head turns:  Turn your head to look over your shoulder  Tilt your chin down and try to touch it to your shoulder  Do not raise your shoulder to your chin  Face forward again  Do the same on the other side  Head rolls:  Slowly bring your chin toward your chest  Next, roll your head to the right  Your ear should be positioned over your shoulder  Hold this position for 5 seconds  Roll your head back toward your chest and to the left into the same position  Hold for 5 seconds  Gently roll your head back and around in a clockwise [place] 3 times   Next, move your head in the reverse direction (counterclockwise) in a [place] 3 times  Do not shrug your shoulders upwards while you do this exercise  Contact your healthcare provider if:   Your pain does not get better, or gets worse  You have questions or concerns about your condition, care, or exercise program     © Copyright [employer] 2022 Information is for End User's use only and may not be sold, redistributed or otherwise used for commercial purposes  All illustrations and images included in CareNotes® are the copyrighted property of [employer]  or Danilo Noriega  The above information is an  only  It is not intended as medical advice for individual conditions or treatments  Talk to your doctor, nurse or pharmacist before following any medical regimen to see if it is safe and effective for you

## 2022-12-14 ENCOUNTER — OFFICE VISIT (OUTPATIENT)
Dept: OBGYN CLINIC | Facility: CLINIC | Age: 57
End: 2022-12-14

## 2022-12-14 ENCOUNTER — HOSPITAL ENCOUNTER (OUTPATIENT)
Dept: RADIOLOGY | Facility: CLINIC | Age: 57
Discharge: HOME | End: 2022-12-14

## 2022-12-14 VITALS
WEIGHT: 192.8 LBS | HEART RATE: 68 BPM | SYSTOLIC BLOOD PRESSURE: 118 MMHG | DIASTOLIC BLOOD PRESSURE: 72 MMHG | BODY MASS INDEX: 28.56 KG/M2 | TEMPERATURE: 97.6 F | HEIGHT: 69 IN

## 2022-12-14 DIAGNOSIS — M25.541 PAIN IN THUMB JOINT WITH MOVEMENT OF RIGHT HAND: Primary | ICD-10-CM

## 2022-12-14 DIAGNOSIS — M25.541 PAIN IN THUMB JOINT WITH MOVEMENT OF RIGHT HAND: ICD-10-CM

## 2022-12-14 DIAGNOSIS — M18.0 ARTHRITIS OF CARPOMETACARPAL (CMC) JOINT OF BOTH THUMBS: ICD-10-CM

## 2022-12-14 DIAGNOSIS — M25.542 PAIN IN THUMB JOINT WITH MOVEMENT OF LEFT HAND: ICD-10-CM

## 2022-12-14 NOTE — PROGRESS NOTES
ASSESSMENT/PLAN:    Diagnoses and all orders for this visit:    Pain in thumb joint with movement of right hand  -     XR hand 3+ vw right; Future    Pain in thumb joint with movement of left hand  -     XR hand 3+ vw left; Future    Arthritis of carpometacarpal [place]) joint of both thumbs      Plan: Treatment options were discussed  He elected to proceed with bilateral first Aia 16 injections which were performed without difficulty  He tolerated these well  I discussed follow-up in 2 to 3 weeks but he would prefer to return only if symptoms do not improve  I have recommended that he contact me if symptoms do not improve and I would refer him to hand surgery for discussion of further treatment options which would likely be surgical   If symptoms respond but return in several months, repeat injection could be considered  I have encouraged him to contact me if he has any questions or concerns  _____________________________________________________  CHIEF COMPLAINT:  Chief Complaint   Patient presents with   • Right Hand - Pain   • Left Hand - Pain         SUBJECTIVE:  Shwetha Pena is a 64y o  year old RHD male who presents for evaluation of bilateral basilar thumb pain, left more significant than right  Patient works as a [employer]/ and performs heavy gripping as a regular activity in his daily duties  He states that in the mornings he wakes up with feelings of stiffness in the base of his thumbs, by the end of his workday, he has significant achy soreness in the base of his thumb  He denies any associated bruising, swelling, or mechanical symptoms  He notes occasional feelings of numbness and tingling in the C5-6 distribution, but notes that he has a previous medical history that includes cervical disc pathology at these levels which are contributing to the symptoms   He has had ongoing symptoms for several months, but notes that in the past 2 months the symptoms have begun to interfere with his ability to complete tasks as desired  PAST MEDICAL HISTORY:  Past Medical History:   Diagnosis Date   • Herniated disc, cervical        PAST SURGICAL HISTORY:  Past Surgical History:   Procedure Laterality Date   • EPIDURAL BLOCK INJECTION N/A 2/17/2022    Procedure: BLOCK / INJECTION EPIDURAL STEROID CERVICAL C7-T1;  Surgeon: Tobi Yung MD;  [facility];  Service: Pain Management    • EPIDURAL BLOCK INJECTION N/A 3/17/2022    Procedure: C7-T1 ILESI or alt level;  Surgeon: Tobi Yung MD;  [facility];  Service: Pain Management    • FL GUIDED NEEDLE PLAC BX/ASP/INJ  3/17/2022   • JOINT REPLACEMENT      knee bilateral    • REPLACEMENT TOTAL KNEE BILATERAL  2018 2019       FAMILY HISTORY:  Family History   Problem Relation Age of Onset   • Arthritis Mother    • Diabetes Father        SOCIAL HISTORY:  Social History     Tobacco Use   • Smoking status: Never   • Smokeless tobacco: Never   Vaping Use   • Vaping Use: Never used   Substance Use Topics   • Alcohol use: Never     Comment: rarely    • Drug use: Never       MEDICATIONS:    Current Outpatient Medications:   •  celecoxib (CeleBREX) 200 mg capsule, Take 1 capsule (200 mg total) by mouth 2 (two) times a day, Disp: 180 capsule, Rfl: 2  •  Magnesium Hydroxide (MAGNESIA PO), Take by mouth, Disp: , Rfl:   •  Omega-3 Fatty Acids (FISH OIL PO), Take by mouth, Disp: , Rfl:   •  pregabalin (LYRICA) 100 mg capsule, Take 1 capsule (100 mg total) by mouth 3 (three) times a day, Disp: 270 capsule, Rfl: 2  •  rosuvastatin (CRESTOR) 10 MG tablet, Take by mouth, Disp: , Rfl:   •  omeprazole (PriLOSEC) 20 mg delayed release capsule, TAKE 1 CAPSULE BY MOUTH EVERY DAY (Patient not taking: Reported on 12/14/2022), Disp: 90 capsule, Rfl: 2    ALLERGIES:  No Known Allergies    Review of systems:   Constitutional: Negative for fatigue, fever or loss of apetite  HENT: Negative  Respiratory: Negative for shortness of breath, dyspnea  Cardiovascular: Negative for chest pain/tightness  Gastrointestinal: Negative for abdominal pain, N/V  Endocrine: Negative for cold/heat intolerance, unexplained weight loss/gain  Genitourinary: Negative for flank pain, dysuria, hematuria  Musculoskeletal: As noted in HPI   Skin: Negative for rash  Neurological: Negative for numbness, tingling, paresthesias  Psychiatric/Behavioral: Negative for agitation  _____________________________________________________  PHYSICAL EXAMINATION:    Blood pressure 118/72, pulse 68, temperature 97 6 °F (36 4 °C), temperature source Temporal, height 5' 9" (1 753 m), weight 87 5 kg (192 lb 12 8 oz)      General: well developed and well nourished, alert, oriented times 3 and appears comfortable  Psychiatric: Normal  HEENT: Benign  Cardiovascular: Normal rate    Pulmonary: No wheezing or stridor  Abdomen: Soft, Nontender  Skin: No masses, erythema, lacerations, fluctation, ulcerations  Neurovascular: Palpable distal pulses, DTRs intact    MUSCULOSKELETAL EXAMINATION:  Bilateral hands/thumbs -   No MCP hyperextension deformity present  No intrinsic atrophy noted, no thenar or hypothenar atrophy noted  Skin is warm dry to touch no signs of erythema, ecchymosis, infection  Mildly TTP over left 1st dorsal compartment, TTP over left thumb CMC joint  Nontender over right 1st dorsal compartment, mildly TTP over right thumb CMC joint  Opposition intact bilaterally  EPB, APB, and FPL mechanisms intact  + CMC load-and-shift left thumb  -CMC load-and-shift right thumb  + CMC grind bilaterally with palpable crepitus  2+ distal radial pulse with brisk capillary refill to the fingers  Radial, median, and ulnar motor and sensory distributions intact  Sensation to light touch intact distally      _____________________________________________________  STUDIES REVIEWED:  Attending Physician has personally reviewed pertinent imaging in PACS, impression is as follows:    Review of radiographic series taken 12/14/2022 of the left hand shows moderate to severe 1st CMC osteoarthritis with subluxation      Review of radiographic series taken 12/14/2022 of the right hand shows moderate 1st CMC osteoarthritis with subluxation        Scribe Attestation    I,:  Javan Medina am acting as a scribe while in the presence of the attending physician :       I,:  Harry Tracy personally performed the services described in this documentation    as scribed in my presence :

## 2023-03-01 ENCOUNTER — TELEPHONE (OUTPATIENT)
Dept: PAIN MEDICINE | Facility: CLINIC | Age: 58
End: 2023-03-01

## 2023-03-01 NOTE — TELEPHONE ENCOUNTER
Pt LVM to call re: Lyrica refill tried calling back [phone] Manc[facility] Handing) call could not be completed as dialed will retry later

## 2023-03-07 ENCOUNTER — TELEPHONE (OUTPATIENT)
Dept: OBGYN CLINIC | Facility: OTHER | Age: 58
End: 2023-03-07

## 2023-03-07 ENCOUNTER — OFFICE VISIT (OUTPATIENT)
Dept: OBGYN CLINIC | Facility: CLINIC | Age: 58
End: 2023-03-07

## 2023-03-07 VITALS
HEART RATE: 70 BPM | WEIGHT: 191 LBS | SYSTOLIC BLOOD PRESSURE: 115 MMHG | HEIGHT: 69 IN | DIASTOLIC BLOOD PRESSURE: 70 MMHG | BODY MASS INDEX: 28.29 KG/M2 | TEMPERATURE: 97.9 F

## 2023-03-07 DIAGNOSIS — M18.0 ARTHRITIS OF CARPOMETACARPAL (CMC) JOINT OF BOTH THUMBS: Primary | ICD-10-CM

## 2023-03-07 NOTE — PATIENT INSTRUCTIONS
Will refer the patient to Dr Jasper Ramirez of hand surgery for evaluation and his expertise in this area of bilateral thumb CMC arthritis  Patient may continue with Celebrex  He may add Tylenol  He may try an over-the-counter neoprene thumb wrist wrap  He says needed for pain or swelling  Follow-up in this office only if needed

## 2023-03-07 NOTE — PROGRESS NOTES
ASSESSMENT/PLAN:    Diagnoses and all orders for this visit:    Arthritis of carpometacarpal [place]) joint of both thumbs  -     Ambulatory Referral to Hand Surgery; Future        Will refer the patient to Dr Amelia Carson of hand surgery for evaluation and his expertise in this area of bilateral thumb CMC arthritis  Patient may continue with Celebrex  He may add Tylenol  He may try an over-the-counter neoprene thumb wrist wrap  He says needed for pain or swelling  Follow-up in this office only if needed  Return if symptoms worsen or fail to improve, for Referred to Dr Amelia Carson of hand surgery  _____________________________________________________  CHIEF COMPLAINT:  Chief Complaint   Patient presents with   • Follow-up     SUBJECTIVE:  Wilda Lopez is a 62y o  year old male who presents for follow up bilateral thumb CMC arthritis that underwent successful cortisone injections 12/14/2022  He notes he got complete relief of pain symptoms for approximately 2 months  Symptoms returned the right hand approximately 2 weeks ago  Left hand is minimal discomfort the base of the thumb  Denies any numbness or tingling  Denies any ill effect from previous injection  Patient is right-hand dominant  He is a       PAST MEDICAL HISTORY:  Past Medical History:   Diagnosis Date   • Herniated disc, cervical      PAST SURGICAL HISTORY:  Past Surgical History:   Procedure Laterality Date   • EPIDURAL BLOCK INJECTION N/A 2/17/2022    Procedure: BLOCK / INJECTION EPIDURAL STEROID CERVICAL C7-T1;  Surgeon: Noe Stevens MD;  Location: [facility];  Service: Pain Management    • EPIDURAL BLOCK INJECTION N/A 3/17/2022    Procedure: C7-T1 ILESI or alt level;  Surgeon: Noe Stevens MD;  Location: [facility];  Service: Pain Management    • FL GUIDED NEEDLE PLAC BX/ASP/INJ  3/17/2022   • JOINT REPLACEMENT      knee bilateral    • REPLACEMENT TOTAL KNEE BILATERAL  2018 2019     FAMILY HISTORY:  Family History Problem Relation Age of Onset   • Arthritis Mother    • Diabetes Father      SOCIAL HISTORY:  Social History     Tobacco Use   • Smoking status: Never   • Smokeless tobacco: Never   Vaping Use   • Vaping Use: Never used   Substance Use Topics   • Alcohol use: Never     Comment: rarely    • Drug use: Never     MEDICATIONS:    Current Outpatient Medications:   •  celecoxib (CeleBREX) 200 mg capsule, Take 1 capsule (200 mg total) by mouth 2 (two) times a day, Disp: 180 capsule, Rfl: 2  •  Magnesium Hydroxide (MAGNESIA PO), Take by mouth, Disp: , Rfl:   •  Omega-3 Fatty Acids (FISH OIL PO), Take by mouth, Disp: , Rfl:   •  omeprazole (PriLOSEC) 20 mg delayed release capsule, TAKE 1 CAPSULE BY MOUTH EVERY DAY, Disp: 90 capsule, Rfl: 2  •  pregabalin (LYRICA) 100 mg capsule, Take 1 capsule (100 mg total) by mouth 3 (three) times a day, Disp: 270 capsule, Rfl: 2  •  rosuvastatin (CRESTOR) 10 MG tablet, Take by mouth, Disp: , Rfl:     ALLERGIES:  No Known Allergies    REVIEW OF SYSTEMS:No focal   Pertinent items are noted in HPI  A comprehensive review of systems was negative     _____________________________________________________  PHYSICAL EXAMINATION:  General: well developed and well nourished, alert, oriented times 3 and appears comfortable  Psychiatric: Normal  HEENT:  Normocephalic, atraumatic  Cardiovascular:  Regular  Pulmonary: No wheezing or stridor  Skin: No masses, erthema, lacerations, fluctation, ulcerations  Neurovascular: No focal neurologic defecit     MUSCULOSKELETAL EXAMINATION:    With palpation at the [facility] joint of the base of bilateral thumbs more pronounced on the right than the left  He is able oppose his thumb to all fingers  He notes increased pain with opposition to the ring and little fingers  Denies numbness or tingling  He has brisk capillary refill  Light touch sensation intact  No signs of infection      _____________________________________________________  STUDIES REVIEWED:  No new studies to review  Previous x-rays were reviewed        PROCEDURES PERFORMED:  None today        Betzy Pollard PA-C

## 2023-03-07 NOTE — TELEPHONE ENCOUNTER
Patient is being referred to a orthopedics  Please schedule accordingly      [address]   [phone]

## 2023-03-15 ENCOUNTER — OFFICE VISIT (OUTPATIENT)
Dept: PAIN MEDICINE | Facility: CLINIC | Age: 58
End: 2023-03-15

## 2023-03-15 VITALS
WEIGHT: 186.6 LBS | DIASTOLIC BLOOD PRESSURE: 68 MMHG | SYSTOLIC BLOOD PRESSURE: 108 MMHG | HEART RATE: 60 BPM | BODY MASS INDEX: 27.64 KG/M2 | HEIGHT: 69 IN | RESPIRATION RATE: 20 BRPM

## 2023-03-15 DIAGNOSIS — M54.12 CERVICAL RADICULOPATHY: ICD-10-CM

## 2023-03-15 DIAGNOSIS — K21.9 GASTROESOPHAGEAL REFLUX DISEASE WITHOUT ESOPHAGITIS: ICD-10-CM

## 2023-03-15 RX ORDER — PREGABALIN 100 MG/1
100 CAPSULE ORAL 3 TIMES DAILY
Qty: 270 CAPSULE | Refills: 3 | Status: SHIPPED | OUTPATIENT
Start: 2023-03-15

## 2023-03-15 RX ORDER — CELECOXIB 200 MG/1
200 CAPSULE ORAL 2 TIMES DAILY
Qty: 180 CAPSULE | Refills: 3 | Status: SHIPPED | OUTPATIENT
Start: 2023-03-15

## 2023-03-15 RX ORDER — OMEPRAZOLE 20 MG/1
20 CAPSULE, DELAYED RELEASE ORAL DAILY
Qty: 90 CAPSULE | Refills: 3 | Status: SHIPPED | OUTPATIENT
Start: 2023-03-15

## 2023-03-15 NOTE — PATIENT INSTRUCTIONS
Neck Exercises   AMBULATORY CARE:   Neck exercises  help reduce neck pain, and improve neck movement and strength  Neck exercises also help prevent long-term neck problems  Call your doctor if:   Your pain does not get better, or gets worse  You have questions or concerns about your condition, care, or exercise program     What you need to know about exercise safety:   Move slowly, gently, and smoothly  Avoid fast or jerky motions  Stand and sit the way your healthcare provider shows you  Good posture may reduce your neck pain  Check your posture often, even when you are not doing your neck exercises  Follow the exercise program recommended by your healthcare provider  He or she will tell you which exercises are best for your condition  He or she will also tell you how many repetitions to do and how often you should do the exercises  How to perform neck exercises safely:   Exercise position:  You may sit or stand while you do neck exercises  Face forward  Your shoulders should be straight and relaxed, with a good posture  Head tilts, forward and back:  Gently bow your head and try to touch your chin to your chest  Your healthcare provider may tell you to push on the back of your neck to help bow your head  Raise your chin back to the starting position  Tilt your head back as far as possible so you are looking up at the ceiling  Your healthcare provider may tell you to lift your chin to help tilt your head back  Return your head to the starting position  Head tilts, side to side:  Tilt your head, bringing your ear toward your shoulder  Then tilt your head toward the other shoulder  Head turns:  Turn your head to look over your shoulder  Tilt your chin down and try to touch it to your shoulder  Do not raise your shoulder to your chin  Face forward again  Do the same on the other side           Head rolls:  Slowly bring your chin toward your chest  Next, roll your head to the right  Your ear should be positioned over your shoulder  Hold this position for 5 seconds  Roll your head back toward your chest and to the left into the same position  Hold for 5 seconds  Gently roll your head back and around in a clockwise [place] 3 times  Next, move your head in the reverse direction (counterclockwise) in a [place] 3 times  Do not shrug your shoulders upwards while you do this exercise  Follow up with your doctor as directed:  Write down your questions so you remember to ask them during your visits  © Copyright [employer] 2022 Information is for End User's use only and may not be sold, redistributed or otherwise used for commercial purposes  The above information is an  only  It is not intended as medical advice for individual conditions or treatments  Talk to your doctor, nurse or pharmacist before following any medical regimen to see if it is safe and effective for you

## 2023-03-15 NOTE — PROGRESS NOTES
Assessment  1  Cervical radiculopathy  -     pregabalin (LYRICA) 100 mg capsule; Take 1 capsule (100 mg total) by mouth 3 (three) times a day  -     celecoxib (CeleBREX) 200 mg capsule; Take 1 capsule (200 mg total) by mouth 2 (two) times a day    Series of epidural steroid injections did not signfiicantly reduce patient's pain long term  Noting significant benefit with lyrica and celebrex with regard to improved IADLs in significantly less pain and no side effects  MRI cervical spine noncontrast shows moderate 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 with marked left transforaminal stenosis  Risks, benefits and alternatives discussed with regard to medications, prn repeat injection; deferring surgery til exhaustion of conservative treatment efforts for now  Previously reported the following symptomatology:     Left-sided neck pain described primarily radicular features into the C5 and C6 dermatomal distribution accompanied by pain limited weakness numbness and paresthesias  +spurling's maneuver left sided; ttp over cervical paraspinal muscles, pain with cervical facet loading bilaterally, left greater than right  MRI from 2014 reviewed which shows moderate broad-based posterior disc protrusion at C5-C6  Superimposed spondylosis and facet degenerative changes resulting in moderate canal stenosis and marked left and moderate right neural foraminal narrowing  Additionally C6-C7 there is moderate broad-based disc protrusion asymmetrically increased to the left lateral reason at C6-C7  There is findings suspicious for some degree of neural compression  Superimposed spondylosis and changes resulting in mild canal narrowing  Marked left and mild right neural foraminal stenosis  Reasonable at this time to proceed with multimodal pain therapy plan focusing on physical therapy in conjunction with medications and epidural steroid injection to target radicular pain      Plan  -continue lyrica 100mg TID; counseled regarding sedative effects of taking this medication and provided up titration calendar  Counseled not to take medication while driving or operating heavy machinery/using stairs  -celebrex 200 mg b i d  prn pain prescribed  Patient educated regarding bleeding risk of taking this medication not taking any other nonsteroidal anti-inflammatory medications while taking this medication; counseled thoroughly regarding potential risk of Cardiovascular injury, Kidney injury, Gastrointestinal ulceration/bleeding  Patient voiced understanding; GI ppx written omeprazole 20mg/day  -physical therapy for left sided cervical radiculopathy; Physician directed home exercise plan as per [facility] demonstrated and handouts provided that patient plans to participate with for 1 hour, twice a week for the next 6 weeks  -f/u 12 months or sooner if pain uncontrolled  -counseled to f/u with pcp for renal labs; most recent 2/9/23 WNL    There are risks associated with opioid medications, including dependence, addiction and tolerance  The patient understands and agrees to use these medications only as prescribed  Potential side effects of the medications include, but are not limited to, constipation, drowsiness, addiction, impaired judgment and risk of fatal overdose if not taken as prescribed  The patient was warned against driving while taking sedation medications  Sharing medications is a felony  At this point in time, the patient is showing no signs of addiction, abuse, diversion or suicidal ideation  [place] Prescription Drug Monitoring Program report was reviewed and was appropriate      Complete risks and benefits including bleeding, infection, tissue reaction, nerve injury and allergic reaction were discussed  The approach was demonstrated using models and literature was provided  Verbal and written consent was obtained       My impressions and treatment recommendations were discussed in detail with the patient who verbalized understanding and had no further questions  Discharge instructions were provided  I personally saw and examined the patient and I agree with the above discussed plan of care  New Medications Ordered This Visit   Medications   • pregabalin (LYRICA) 100 mg capsule     Sig: Take 1 capsule (100 mg total) by mouth 3 (three) times a day     Dispense:  270 capsule     Refill:  3   • celecoxib (CeleBREX) 200 mg capsule     Sig: Take 1 capsule (200 mg total) by mouth 2 (two) times a day     Dispense:  180 capsule     Refill:  3       History of Present Illness    Series of epidural steroid injections did not signfiicantly reduce patient's pain long term  Noting significant benefit with lyrica and celebrex with regard to improved IADLs in significantly less pain and no side effects  Previously reported the following symptomatology:     Domingo Craven is a 62 y o  male with pmhx of XOL presenting with a past medical history of left-sided neck pain described primarily as radicular nature  The pain radiates in the C5 and C6 dermatomal distributions and is primarily left-sided  Patient had an acute exacerbation of the pain over the past few days after engaging in strenuous overhead maneuvers  The pain contributes to significant disability in participation with independent activities of daily living and is accompanied by weakness numbness and paresthesias that are debilitating in nature  The patient describes that overhead maneuvers such as combing hair is significantly limiting with respect to strength in the left arm/hand  The patient notes significant pain limited weakness with  left hand  as well  The patient has not been to physical therapy but was recently seen in ED where imaging of cervical spine showed at least moderate spondylosis and disc degeneration at C4-C7  He has trialed conservative measures including celebrex and flexeril for the pain but has not trialed any steroids    He has never had interventional pain procedures in the past including any cervical interlaminar epidural steroid injections in the past for his pain  I have personally reviewed and/or updated the patient's past medical history, past surgical history, family history, social history, current medications, allergies, and vital signs today  Review of Systems   Constitutional: Positive for activity change  HENT: Negative  Eyes: Negative  Respiratory: Negative  Cardiovascular: Negative  Gastrointestinal: Negative  Endocrine: Negative  Genitourinary: Negative  Musculoskeletal: Positive for arthralgias, myalgias, neck pain and neck stiffness  Skin: Negative  Allergic/Immunologic: Negative  Neurological: Positive for weakness and numbness  Hematological: Negative  Psychiatric/Behavioral: Negative  All other systems reviewed and are negative        Patient Active Problem List   Diagnosis   • Cervical radiculopathy   • Gastroesophageal reflux disease without esophagitis   • Pain in thumb joint with movement of right hand   • Pain in thumb joint with movement of left hand   • Arthritis of carpometacarpal (CMC) joint of both thumbs       Past Medical History:   Diagnosis Date   • Herniated disc, cervical        Past Surgical History:   Procedure Laterality Date   • EPIDURAL BLOCK INJECTION N/A 2/17/2022    Procedure: BLOCK / INJECTION EPIDURAL STEROID CERVICAL C7-T1;  Surgeon: Lara Vale MD;  Location: [facility];  Service: Pain Management    • EPIDURAL BLOCK INJECTION N/A 3/17/2022    Procedure: C7-T1 ILESI or alt level;  Surgeon: Lara Vale MD;  Location: [facility];  Service: Pain Management    • FL GUIDED NEEDLE PLAC BX/ASP/INJ  3/17/2022   • JOINT REPLACEMENT      knee bilateral    • REPLACEMENT TOTAL KNEE BILATERAL  2018 2019       Family History   Problem Relation Age of Onset   • Arthritis Mother    • Diabetes Father        Social History     Occupational History   • Not on file Tobacco Use   • Smoking status: Never   • Smokeless tobacco: Never   Vaping Use   • Vaping Use: Never used   Substance and Sexual Activity   • Alcohol use: Never     Comment: rarely    • Drug use: Never   • Sexual activity: Not on file       Current Outpatient Medications on File Prior to Visit   Medication Sig   • Magnesium Hydroxide (MAGNESIA PO) Take by mouth   • Omega-3 Fatty Acids (FISH OIL PO) Take by mouth   • omeprazole (PriLOSEC) 20 mg delayed release capsule TAKE 1 CAPSULE BY MOUTH EVERY DAY   • rosuvastatin (CRESTOR) 10 MG tablet Take by mouth   • [DISCONTINUED] celecoxib (CeleBREX) 200 mg capsule Take 1 capsule (200 mg total) by mouth 2 (two) times a day   • [DISCONTINUED] pregabalin (LYRICA) 100 mg capsule Take 1 capsule (100 mg total) by mouth 3 (three) times a day (Patient not taking: Reported on 3/15/2023)     No current facility-administered medications on file prior to visit  No Known Allergies      Physical Exam    /68   Pulse 60   Resp 20   Ht 5' 9" (1 753 m)   Wt 84 6 kg (186 lb 9 6 oz)   BMI 27 56 kg/m²     Constitutional: normal, well developed, well nourished, alert, in no distress and non-toxic and no overt pain behavior  Eyes: anicteric  HEENT: grossly intact  Neck: supple, symmetric, trachea midline and no masses   Pulmonary:even and unlabored  Cardiovascular:No edema or pitting edema present  Skin:Normal without rashes or lesions and well hydrated  Psychiatric:Mood and affect appropriate  Neurologic:Cranial Nerves II-XII grossly intact Sensation grossly intact; no clonus negative kern's  Reflexes 2+ and brisk  Spurling's maneuver positive left sided  Musculoskeletal:normal gait  5/5 strength bilaterally with AROM in all extremities  signficant ain with cervical facet loading bilaterally and with lateral spine rotation  ttp over cervical paraspinal muscles       Imaging    CERVICAL SPINE     INDICATION:   neck pain      COMPARISON:  None     VIEWS:  XR SPINE CERVICAL 2 OR 3 VW INJURY   Images: 3     FINDINGS:     No fracture or subluxation       Straightening of the usual lordosis      Moderate multilevel spondylitic changes C4-C7       The prevertebral soft tissues are within normal limits        The lung apices are clear      IMPRESSION:     No acute osseous abnormality      Degenerative changes as above       MRI from 2014 reviewed which shows moderate broad-based posterior disc protrusion at C5-C6  Superimposed spondylosis and facet degenerative changes resulting in moderate canal stenosis and marked left and moderate right neural foraminal narrowing  Additionally C6-C7 there is moderate broad-based disc protrusion asymmetrically increased to the left lateral reason at C6-C7  There is findings suspicious for some degree of neural compression  Superimposed spondylosis and changes resulting in mild canal narrowing  Marked left and mild right neural foraminal stenosis

## 2023-05-23 NOTE — PROGRESS NOTES
Assessment  1  Cervical radiculopathy  -     oxyCODONE-acetaminophen (Percocet) 5-325 mg per tablet; Take 1 tablet by mouth every 6 (six) hours as needed for moderate pain Max Daily Amount: 4 tablets  -     gabapentin (Neurontin) 600 MG tablet; Take 1 tablet (600 mg total) by mouth 3 (three) times a day  -     methylPREDNISolone 4 MG tablet therapy pack; Use as directed on package  -     Rapid drug screen, urine  -     MM ALL_Prescribed Meds and Special Instructions  -     MM DT_Alprazolam Definitive Test  -     MM DT_Amphetamine Definitive Test  -     MM DT_Buprenorphine Definitive Test  -     MM DT_Bupropion Definitive Test  -     MM DT_Carisoprodol Definitive Test  -     MM DT_Citalopram/Escitalopram Definitive Test  -     MM DT_Clonazepam Definitive Test  -     MM DT_Cocaine Definitive Test  -     MM DT_Codeine Definitive Test  -     MM Diazepam Definitive Test  -     MM DT_Ethyl Glucuronide Screen and Confirm Positive  -     MM DT_Fentanyl Definitive Test  -     MM DT_Heroin Definitive Test  -     MM DT_Hydrocodone Definitive Test  -     MM DT_Hydromorphone Definitive Test  -     MM DT_Kratom Definitive Test  -     MM Lorazepam Definitive Test  -     MM DT_MDMA Definitive Test  -     MM DT_Methadone Definitive Test  -     MM DT_Methamphetamine Definitive Test  -     MM DT_Morphine Definitive Test  -     MM DT_Oxazepam Definitive Test  -     MM DT_Oxycodone Definitive Test  -     MM DT_Oxymorphone Definitive Test  -     MM DT_Pregablin Definitive  -     MM DT_Tapentadol Definitive Test  -     MM DT_Temazapam Definitive Test  -     MM DT_THC Definitive Test  -     MM DT_Tramadol Definitive Test  -     MM DT_Validity Creatinine  -     MM DT_Validity Oxidant  -     MM DT_Validity pH  -     MM DT_Validity Specific    2  Opioid use, unspecified, uncomplicated  -     Rapid drug screen, urine    3   Chronic pain syndrome  -     Rapid drug screen, urine    Greater than 50% relief of pain with improved ability to participate with IADLs after ILESI at C7-T1 for approximately 1 month  Symptoms have recurred and patient is in debilitating pain  New MRI cervical spine noncontrast shows moderate 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 with marked left transforaminal stenosis  Risks, benefits and alternatives discussed with regard to opioid therapy, repeat injection and referral to [address] [place] [address] for possible acdf, decompression; has appt tomorrow to see spine surgeon  Previously reported the following symptomatology:     Left-sided neck pain described primarily radicular features into the C5 and C6 dermatomal distribution accompanied by pain limited weakness numbness and paresthesias  +spurling's maneuver left sided; ttp over cervical paraspinal muscles, pain with cervical facet loading bilaterally, left greater than right  MRI from 2014 reviewed which shows moderate broad-based posterior disc protrusion at C5-C6  Superimposed spondylosis and facet degenerative changes resulting in moderate canal stenosis and marked left and moderate right neural foraminal narrowing  Additionally C6-C7 there is moderate broad-based disc protrusion asymmetrically increased to the left lateral reason at C6-C7  There is findings suspicious for some degree of neural compression  Superimposed spondylosis and changes resulting in mild canal narrowing  Marked left and mild right neural foraminal stenosis  Reasonable at this time to proceed with multimodal pain therapy plan focusing on physical therapy in conjunction with medications and epidural steroid injection to target radicular pain      Plan  -C7-T1 ILESI or alternate level deferred for now  -medrol dose pack rx;   -opioid use agreement signed by both parties; uds obtained; had been taking old percocet; risks discussed below; percocet 5-325mg [id number] prn moderate to severe pain   -gabapentin 300 mg t i d  increased to 600mg TID; counseled regarding sedative effects of taking this medication and provided up titration calendar  Counseled not to take medication while driving or operating heavy machinery/using stairs  -celebrex 100 mg b i d  prn pain previously prescribed  Patient educated regarding bleeding risk of taking this medication not taking any other nonsteroidal anti-inflammatory medications while taking this medication; counseled thoroughly regarding potential risk of Cardiovascular injury, Kidney injury, Gastrointestinal ulceration/bleeding  Patient voiced understanding  -physical therapy for left sided cervical radiculopathy; Physician directed home exercise plan as per [facility] demonstrated and handouts provided that patient plans to participate with for 1 hour, twice a week for the next 6 weeks  There are risks associated with opioid medications, including dependence, addiction and tolerance  The patient understands and agrees to use these medications only as prescribed  Potential side effects of the medications include, but are not limited to, constipation, drowsiness, addiction, impaired judgment and risk of fatal overdose if not taken as prescribed  The patient was warned against driving while taking sedation medications  Sharing medications is a felony  At this point in time, the patient is showing no signs of addiction, abuse, diversion or suicidal ideation  [place] Prescription Drug Monitoring Program report was reviewed and was appropriate      Complete risks and benefits including bleeding, infection, tissue reaction, nerve injury and allergic reaction were discussed  The approach was demonstrated using models and literature was provided  Verbal and written consent was obtained  My impressions and treatment recommendations were discussed in detail with the patient who verbalized understanding and had no further questions  Discharge instructions were provided  I personally saw and examined the patient and I agree with the above discussed plan of care      My impressions and treatment recommendations were discussed in detail with the patient who verbalized understanding and had no further questions  Discharge instructions were provided  I personally saw and examined the patient and I agree with the above discussed plan of care  New Medications Ordered This Visit   Medications    oxyCODONE-acetaminophen (Percocet) 5-325 mg per tablet     Sig: Take 1 tablet by mouth every 6 (six) hours as needed for moderate pain Max Daily Amount: 4 tablets     Dispense:  120 tablet     Refill:  0    gabapentin (Neurontin) 600 MG tablet     Sig: Take 1 tablet (600 mg total) by mouth 3 (three) times a day     Dispense:  270 tablet     Refill:  0    methylPREDNISolone 4 MG tablet therapy pack     Sig: Use as directed on package     Dispense:  21 tablet     Refill:  0       History of Present Illness    Greater than 50% relief of pain with improved ability to participate with IADLs after ILESI at C7-T1 for approximately 1 month  Symptoms have recurred and patient is in debilitating pain  New MRI cervical spine noncontrast shows moderate 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 with marked left transforaminal stenosis  Risks, benefits and alternatives discussed with regard to opioid therapy, repeat injection and referral to [address] [place] [address] for possible acdf, decompression; has appt tomorrow to see spine surgeon  Previously reported the following symptomatology:     Francisco Javier Boyd is a 64 y o  male with pmhx of XOL presenting with a past medical history of left-sided neck pain described primarily as radicular nature  The pain radiates in the C5 and C6 dermatomal distributions and is primarily left-sided  Patient had an acute exacerbation of the pain over the past few days after engaging in strenuous overhead maneuvers    The pain contributes to significant disability in participation with independent activities of daily living and is accompanied by weakness numbness and paresthesias that are debilitating in nature  The patient describes that overhead maneuvers such as combing hair is significantly limiting with respect to strength in the left arm/hand  The patient notes significant pain limited weakness with  left hand  as well  The patient has not been to physical therapy but was recently seen in ED where imaging of cervical spine showed at least moderate spondylosis and disc degeneration at C4-C7  He has trialed conservative measures including celebrex and flexeril for the pain but has not trialed any steroids  He has never had interventional pain procedures in the past including any cervical interlaminar epidural steroid injections in the past for his pain  I have personally reviewed and/or updated the patient's past medical history, past surgical history, family history, social history, current medications, allergies, and vital signs today  Review of Systems   Constitutional: Positive for activity change  HENT: Negative  Eyes: Negative  Respiratory: Negative  Cardiovascular: Negative  Gastrointestinal: Negative  Endocrine: Negative  Genitourinary: Negative  Musculoskeletal: Positive for arthralgias, myalgias, neck pain and neck stiffness  Skin: Negative  Allergic/Immunologic: Negative  Neurological: Positive for weakness and numbness  Hematological: Negative  Psychiatric/Behavioral: Negative  All other systems reviewed and are negative  There is no problem list on file for this patient        Past Medical History:   Diagnosis Date    Herniated disc, cervical        Past Surgical History:   Procedure Laterality Date    EPIDURAL BLOCK INJECTION N/A 2/17/2022    Procedure: BLOCK / INJECTION EPIDURAL STEROID CERVICAL C7-T1;  Surgeon: Tara Lin MD;  Location: [facility];  Service: Pain Management     JOINT REPLACEMENT      knee bilateral     REPLACEMENT TOTAL KNEE BILATERAL  2018 2019       Family History   Problem Relation Age of Onset    Arthritis Mother     Diabetes Father        Social History     Occupational History    Not on file   Tobacco Use    Smoking status: Never Smoker    Smokeless tobacco: Never Used   Vaping Use    Vaping Use: Never used   Substance and Sexual Activity    Alcohol use: Never     Comment: rarely     Drug use: Never    Sexual activity: Not on file       Current Outpatient Medications on File Prior to Visit   Medication Sig    celecoxib (CeleBREX) 100 mg capsule Take 1 capsule (100 mg total) by mouth 2 (two) times a day as needed for mild pain    Magnesium Hydroxide (MAGNESIA PO) Take by mouth    Omega-3 Fatty Acids (FISH OIL PO) Take by mouth    rosuvastatin (Crestor) 10 MG tablet Take by mouth    [DISCONTINUED] gabapentin (NEURONTIN) 300 mg capsule Take 1 capsule (300 mg total) by mouth 3 (three) times a day    [DISCONTINUED] cyclobenzaprine (FLEXERIL) 10 mg tablet Take 1 tablet (10 mg total) by mouth 2 (two) times a day as needed for muscle spasms (Patient not taking: Reported on 2/16/2022 )     No current facility-administered medications on file prior to visit  No Known Allergies      Physical Exam    /70   Pulse 71   Temp 97 5 °F (36 4 °C)   Resp 20   Ht 5' 9" (1 753 m)   Wt 86 5 kg (190 lb 12 8 oz)   BMI 28 18 kg/m²     Constitutional: normal, well developed, well nourished, alert, in no distress and non-toxic and no overt pain behavior  Eyes: anicteric  HEENT: grossly intact  Neck: supple, symmetric, trachea midline and no masses   Pulmonary:even and unlabored  Cardiovascular:No edema or pitting edema present  Skin:Normal without rashes or lesions and well hydrated  Psychiatric:Mood and affect appropriate  Neurologic:Cranial Nerves II-XII grossly intact Sensation grossly intact; no clonus negative kern's  Reflexes 2+ and brisk  Spurling's maneuver positive left sided  Musculoskeletal:normal gait  5/5 strength bilaterally with AROM in all extremities  signficant ain with cervical facet loading bilaterally and with lateral spine rotation  ttp over cervical paraspinal muscles  Imaging    CERVICAL SPINE     INDICATION:   neck pain      COMPARISON:  None     VIEWS:  [facility] SPINE CERVICAL 2 OR 3 VW INJURY   Images: 3     FINDINGS:     No fracture or subluxation       Straightening of the usual lordosis      Moderate multilevel spondylitic changes C4-C7       The prevertebral soft tissues are within normal limits        The lung apices are clear      IMPRESSION:     No acute osseous abnormality      Degenerative changes as above       MRI from 2014 reviewed which shows moderate broad-based posterior disc protrusion at C5-C6  Superimposed spondylosis and facet degenerative changes resulting in moderate canal stenosis and marked left and moderate right neural foraminal narrowing  Additionally C6-C7 there is moderate broad-based disc protrusion asymmetrically increased to the left lateral reason at C6-C7  There is findings suspicious for some degree of neural compression  Superimposed spondylosis and changes resulting in mild canal narrowing  Marked left and mild right neural foraminal stenosis  nl

## 2023-10-27 DIAGNOSIS — M54.12 CERVICAL RADICULOPATHY: ICD-10-CM

## 2023-10-30 RX ORDER — PREGABALIN 100 MG/1
100 CAPSULE ORAL 3 TIMES DAILY
Qty: 270 CAPSULE | Refills: 3 | Status: SHIPPED | OUTPATIENT
Start: 2023-10-30

## 2024-01-01 ENCOUNTER — HOSPITAL ENCOUNTER (EMERGENCY)
Facility: HOSPITAL | Age: 59
Discharge: HOME | End: 2024-01-01
Attending: EMERGENCY MEDICINE
Payer: COMMERCIAL

## 2024-01-01 ENCOUNTER — APPOINTMENT (EMERGENCY)
Dept: CT IMAGING | Facility: HOSPITAL | Age: 59
End: 2024-01-01
Payer: COMMERCIAL

## 2024-01-01 VITALS
HEART RATE: 63 BPM | TEMPERATURE: 97.6 F | SYSTOLIC BLOOD PRESSURE: 108 MMHG | RESPIRATION RATE: 17 BRPM | OXYGEN SATURATION: 91 % | DIASTOLIC BLOOD PRESSURE: 57 MMHG

## 2024-01-01 DIAGNOSIS — R10.9 RIGHT FLANK PAIN: ICD-10-CM

## 2024-01-01 DIAGNOSIS — N21.0 BLADDER CALCULUS: ICD-10-CM

## 2024-01-01 DIAGNOSIS — N20.1 URETEROLITHIASIS: Primary | ICD-10-CM

## 2024-01-01 LAB
ALBUMIN SERPL BCP-MCNC: 4.5 G/DL (ref 3.5–5)
ALP SERPL-CCNC: 77 U/L (ref 34–104)
ALT SERPL W P-5'-P-CCNC: 21 U/L (ref 7–52)
AMORPH PHOS CRY URNS QL MICRO: NORMAL /HPF
ANION GAP SERPL CALCULATED.3IONS-SCNC: 7 MMOL/L
AST SERPL W P-5'-P-CCNC: 16 U/L (ref 13–39)
BACTERIA UR QL AUTO: NORMAL /HPF
BASOPHILS # BLD AUTO: 0.04 THOUSANDS/ÂΜL (ref 0–0.1)
BASOPHILS NFR BLD AUTO: 1 % (ref 0–1)
BILIRUB SERPL-MCNC: 0.53 MG/DL (ref 0.2–1)
BILIRUB UR QL STRIP: NEGATIVE
BUN SERPL-MCNC: 21 MG/DL (ref 5–25)
CALCIUM SERPL-MCNC: 9.2 MG/DL (ref 8.4–10.2)
CHLORIDE SERPL-SCNC: 104 MMOL/L (ref 96–108)
CLARITY UR: CLEAR
CO2 SERPL-SCNC: 26 MMOL/L (ref 21–32)
COLOR UR: YELLOW
CREAT SERPL-MCNC: 0.83 MG/DL (ref 0.6–1.3)
EOSINOPHIL # BLD AUTO: 0.07 THOUSAND/ÂΜL (ref 0–0.61)
EOSINOPHIL NFR BLD AUTO: 1 % (ref 0–6)
ERYTHROCYTE [DISTWIDTH] IN BLOOD BY AUTOMATED COUNT: 11.9 % (ref 11.6–15.1)
GFR SERPL CREATININE-BSD FRML MDRD: 96 ML/MIN/1.73SQ M
GLUCOSE SERPL-MCNC: 141 MG/DL (ref 65–140)
GLUCOSE UR STRIP-MCNC: NEGATIVE MG/DL
HCT VFR BLD AUTO: 44.6 % (ref 36.5–49.3)
HGB BLD-MCNC: 14.9 G/DL (ref 12–17)
HGB UR QL STRIP.AUTO: ABNORMAL
IMM GRANULOCYTES # BLD AUTO: 0.03 THOUSAND/UL (ref 0–0.2)
IMM GRANULOCYTES NFR BLD AUTO: 0 % (ref 0–2)
KETONES UR STRIP-MCNC: NEGATIVE MG/DL
LEUKOCYTE ESTERASE UR QL STRIP: NEGATIVE
LIPASE SERPL-CCNC: 31 U/L (ref 11–82)
LYMPHOCYTES # BLD AUTO: 2.16 THOUSANDS/ÂΜL (ref 0.6–4.47)
LYMPHOCYTES NFR BLD AUTO: 25 % (ref 14–44)
MCH RBC QN AUTO: 30 PG (ref 26.8–34.3)
MCHC RBC AUTO-ENTMCNC: 33.4 G/DL (ref 31.4–37.4)
MCV RBC AUTO: 90 FL (ref 82–98)
MONOCYTES # BLD AUTO: 0.66 THOUSAND/ÂΜL (ref 0.17–1.22)
MONOCYTES NFR BLD AUTO: 8 % (ref 4–12)
NEUTROPHILS # BLD AUTO: 5.86 THOUSANDS/ÂΜL (ref 1.85–7.62)
NEUTS SEG NFR BLD AUTO: 65 % (ref 43–75)
NITRITE UR QL STRIP: NEGATIVE
NON-SQ EPI CELLS URNS QL MICRO: NORMAL /HPF
NRBC BLD AUTO-RTO: 0 /100 WBCS
PH UR STRIP.AUTO: 7.5 [PH]
PLATELET # BLD AUTO: 169 THOUSANDS/UL (ref 149–390)
PMV BLD AUTO: 9.9 FL (ref 8.9–12.7)
POTASSIUM SERPL-SCNC: 4.2 MMOL/L (ref 3.5–5.3)
PROT SERPL-MCNC: 7.1 G/DL (ref 6.4–8.4)
PROT UR STRIP-MCNC: NEGATIVE MG/DL
RBC # BLD AUTO: 4.96 MILLION/UL (ref 3.88–5.62)
RBC #/AREA URNS AUTO: NORMAL /HPF
SODIUM SERPL-SCNC: 137 MMOL/L (ref 135–147)
SP GR UR STRIP.AUTO: 1.01 (ref 1–1.03)
UROBILINOGEN UR QL STRIP.AUTO: 0.2 E.U./DL
WBC # BLD AUTO: 8.82 THOUSAND/UL (ref 4.31–10.16)
WBC #/AREA URNS AUTO: NORMAL /HPF

## 2024-01-01 PROCEDURE — 96365 THER/PROPH/DIAG IV INF INIT: CPT

## 2024-01-01 PROCEDURE — 83690 ASSAY OF LIPASE: CPT | Performed by: EMERGENCY MEDICINE

## 2024-01-01 PROCEDURE — 99284 EMERGENCY DEPT VISIT MOD MDM: CPT

## 2024-01-01 PROCEDURE — 80053 COMPREHEN METABOLIC PANEL: CPT | Performed by: EMERGENCY MEDICINE

## 2024-01-01 PROCEDURE — 36415 COLL VENOUS BLD VENIPUNCTURE: CPT | Performed by: EMERGENCY MEDICINE

## 2024-01-01 PROCEDURE — 74176 CT ABD & PELVIS W/O CONTRAST: CPT

## 2024-01-01 PROCEDURE — 99285 EMERGENCY DEPT VISIT HI MDM: CPT | Performed by: EMERGENCY MEDICINE

## 2024-01-01 PROCEDURE — 85025 COMPLETE CBC W/AUTO DIFF WBC: CPT | Performed by: EMERGENCY MEDICINE

## 2024-01-01 PROCEDURE — 81001 URINALYSIS AUTO W/SCOPE: CPT | Performed by: EMERGENCY MEDICINE

## 2024-01-01 PROCEDURE — 96375 TX/PRO/DX INJ NEW DRUG ADDON: CPT

## 2024-01-01 PROCEDURE — G1004 CDSM NDSC: HCPCS

## 2024-01-01 RX ORDER — IBUPROFEN 600 MG/1
600 TABLET ORAL EVERY 6 HOURS PRN
Qty: 20 TABLET | Refills: 0 | Status: SHIPPED | OUTPATIENT
Start: 2024-01-01

## 2024-01-01 RX ORDER — TAMSULOSIN HYDROCHLORIDE 0.4 MG/1
0.4 CAPSULE ORAL ONCE
Status: COMPLETED | OUTPATIENT
Start: 2024-01-01 | End: 2024-01-01

## 2024-01-01 RX ORDER — KETOROLAC TROMETHAMINE 30 MG/ML
30 INJECTION, SOLUTION INTRAMUSCULAR; INTRAVENOUS ONCE
Status: COMPLETED | OUTPATIENT
Start: 2024-01-01 | End: 2024-01-01

## 2024-01-01 RX ORDER — ONDANSETRON 2 MG/ML
4 INJECTION INTRAMUSCULAR; INTRAVENOUS ONCE
Status: COMPLETED | OUTPATIENT
Start: 2024-01-01 | End: 2024-01-01

## 2024-01-01 RX ORDER — OXYCODONE HYDROCHLORIDE AND ACETAMINOPHEN 5; 325 MG/1; MG/1
1 TABLET ORAL EVERY 6 HOURS PRN
Qty: 15 TABLET | Refills: 0 | Status: SHIPPED | OUTPATIENT
Start: 2024-01-01 | End: 2024-01-11

## 2024-01-01 RX ORDER — TAMSULOSIN HYDROCHLORIDE 0.4 MG/1
0.4 CAPSULE ORAL
Qty: 5 CAPSULE | Refills: 0 | Status: SHIPPED | OUTPATIENT
Start: 2024-01-01 | End: 2024-01-06

## 2024-01-01 RX ADMIN — SODIUM CHLORIDE, SODIUM LACTATE, POTASSIUM CHLORIDE, AND CALCIUM CHLORIDE 1000 ML: .6; .31; .03; .02 INJECTION, SOLUTION INTRAVENOUS at 07:54

## 2024-01-01 RX ADMIN — TAMSULOSIN HYDROCHLORIDE 0.4 MG: 0.4 CAPSULE ORAL at 08:55

## 2024-01-01 RX ADMIN — KETOROLAC TROMETHAMINE 30 MG: 30 INJECTION, SOLUTION INTRAMUSCULAR at 07:53

## 2024-01-01 RX ADMIN — ONDANSETRON 4 MG: 2 INJECTION INTRAMUSCULAR; INTRAVENOUS at 07:53

## 2024-01-01 NOTE — Clinical Note
Osei Rodriguez was seen and treated in our emergency department on 1/1/2024.                Diagnosis:     Osei  may return to work on return date.    He may return on this date: 01/03/2024         If you have any questions or concerns, please don't hesitate to call.      Lazaro San MD    ______________________________           _______________          _______________  Hospital Representative                              Date                                Time

## 2024-01-01 NOTE — ED PROVIDER NOTES
History  Chief Complaint   Patient presents with    Flank Pain     Pt reports right flank pain around 0000, states he vomited X 1 but denies nausea       History provided by:  Medical records and patient  Flank Pain  Pain location:  R flank  Pain quality: bloating, cramping, dull and gnawing    Pain radiates to:  RLQ  Pain severity:  Moderate  Onset quality:  Sudden  Duration:  8 hours  Timing:  Constant  Progression:  Waxing and waning  Chronicity:  New  Context comment:  Patient states he developed right flank pain associated with bloated feeling last night 8 hours prior to arrival, with some radiation into the right lower quadrant, associated with nausea and 1 episode of vomiting  Relieved by:  Nothing  Worsened by:  Nothing  Ineffective treatments:  None tried (Patient had a bowel movement last night without any relief of pain)  Associated symptoms: nausea and vomiting    Associated symptoms: no chest pain, no chills, no cough, no diarrhea, no dysuria, no fatigue, no fever, no hematuria, no shortness of breath and no sore throat        Prior to Admission Medications   Prescriptions Last Dose Informant Patient Reported? Taking?   Magnesium Hydroxide (MAGNESIA PO)   Yes No   Sig: Take by mouth   Omega-3 Fatty Acids (FISH OIL PO)   Yes No   Sig: Take by mouth   celecoxib (CeleBREX) 200 mg capsule   No No   Sig: Take 1 capsule (200 mg total) by mouth 2 (two) times a day   omeprazole (PriLOSEC) 20 mg delayed release capsule   No No   Sig: Take 1 capsule (20 mg total) by mouth daily   pregabalin (LYRICA) 100 mg capsule   No No   Sig: TAKE 1 CAPSULE BY MOUTH THREE TIMES A DAY   rosuvastatin (CRESTOR) 10 MG tablet   Yes No   Sig: Take by mouth      Facility-Administered Medications: None       Past Medical History:   Diagnosis Date    Herniated disc, cervical        Past Surgical History:   Procedure Laterality Date    EPIDURAL BLOCK INJECTION N/A 2/17/2022    Procedure: BLOCK / INJECTION EPIDURAL STEROID CERVICAL  C7-T1;  Surgeon: Jeff Davenport MD;  Location: [facility];  Service: Pain Management     EPIDURAL BLOCK INJECTION N/A 3/17/2022    Procedure: C7-T1 ILESI or alt level;  Surgeon: Jeff Davenport MD;  Location: [facility];  Service: Pain Management     FL GUIDED NEEDLE PLAC BX/ASP/INJ  3/17/2022    JOINT REPLACEMENT      knee bilateral     REPLACEMENT TOTAL KNEE BILATERAL  2018 2019       Family History   Problem Relation Age of Onset    Arthritis Mother     Diabetes Father      I have reviewed and agree with the history as documented.    E-Cigarette/Vaping    E-Cigarette Use Never User      E-Cigarette/Vaping Substances     Social History     Tobacco Use    Smoking status: Never    Smokeless tobacco: Never   Vaping Use    Vaping status: Never Used   Substance Use Topics    Alcohol use: Never     Comment: rarely     Drug use: Never       Review of Systems   Constitutional:  Negative for appetite change, chills, fatigue and fever.   HENT:  Negative for ear pain, rhinorrhea, sore throat and trouble swallowing.    Eyes:  Negative for pain, discharge and visual disturbance.   Respiratory:  Negative for cough, chest tightness and shortness of breath.    Cardiovascular:  Negative for chest pain and palpitations.   Gastrointestinal:  Positive for nausea and vomiting. Negative for abdominal pain and diarrhea.   Endocrine: Negative for polydipsia, polyphagia and polyuria.   Genitourinary:  Positive for flank pain. Negative for difficulty urinating, dysuria, hematuria and testicular pain.   Musculoskeletal:  Negative for arthralgias and back pain.   Skin:  Negative for color change and rash.   Allergic/Immunologic: Negative for immunocompromised state.   Neurological:  Negative for dizziness, seizures, syncope, weakness and headaches.   Hematological:  Negative for adenopathy.   Psychiatric/Behavioral:  Negative for confusion and dysphoric mood.    All other systems reviewed and are negative.      Physical Exam  Physical  Exam  Vitals and nursing note reviewed.   Constitutional:       General: He is not in acute distress.     Appearance: Normal appearance. He is not ill-appearing, toxic-appearing or diaphoretic.   HENT:      Head: Normocephalic and atraumatic.      Nose: Nose normal. No congestion or rhinorrhea.      Mouth/Throat:      Mouth: Mucous membranes are moist.      Pharynx: Oropharynx is clear. No oropharyngeal exudate or posterior oropharyngeal erythema.   Eyes:      General:         Right eye: No discharge.         Left eye: No discharge.   Cardiovascular:      Rate and Rhythm: Normal rate and regular rhythm.      Pulses: Normal pulses.      Heart sounds: Normal heart sounds. No murmur heard.     No gallop.   Pulmonary:      Effort: Pulmonary effort is normal. No respiratory distress.      Breath sounds: Normal breath sounds. No stridor. No wheezing, rhonchi or rales.   Chest:      Chest wall: No tenderness.   Abdominal:      General: Bowel sounds are normal. There is no distension.      Palpations: Abdomen is soft. There is no mass.      Tenderness: There is no abdominal tenderness. There is no right CVA tenderness, left CVA tenderness, guarding or rebound.      Hernia: No hernia is present.   Musculoskeletal:         General: Normal range of motion.      Cervical back: Normal range of motion and neck supple.   Skin:     General: Skin is warm and dry.      Capillary Refill: Capillary refill takes less than 2 seconds.   Neurological:      General: No focal deficit present.      Mental Status: He is alert and oriented to person, place, and time.      Cranial Nerves: No cranial nerve deficit.      Sensory: No sensory deficit.      Motor: No weakness.      Coordination: Coordination normal.      Gait: Gait normal.      Deep Tendon Reflexes: Reflexes normal.   Psychiatric:         Mood and Affect: Mood normal.         Behavior: Behavior normal.         Thought Content: Thought content normal.         Judgment: Judgment  normal.         Vital Signs  ED Triage Vitals [01/01/24 0631]   Temperature Pulse Respirations Blood Pressure SpO2   97.6 °F (36.4 °C) 71 16 133/84 96 %      Temp Source Heart Rate Source Patient Position - Orthostatic VS BP Location FiO2 (%)   Temporal Monitor Sitting Left arm --      Pain Score       7           Vitals:    01/01/24 0631 01/01/24 0846   BP: 133/84 108/57   Pulse: 71 63   Patient Position - Orthostatic VS: Sitting Lying         Visual Acuity      ED Medications  Medications   lactated ringers bolus 1,000 mL (0 mL Intravenous Stopped 1/1/24 0854)   ketorolac (TORADOL) injection 30 mg (30 mg Intravenous Given 1/1/24 0753)   ondansetron (ZOFRAN) injection 4 mg (4 mg Intravenous Given 1/1/24 0753)   tamsulosin (FLOMAX) capsule 0.4 mg (0.4 mg Oral Given 1/1/24 0855)       Diagnostic Studies  Results Reviewed       Procedure Component Value Units Date/Time    Urine Microscopic [[id number]] Collected: 01/01/24 0756    Lab Status: Final result Specimen: Urine, Clean Catch Updated: 01/01/24 0843     RBC, UA 0-1 /hpf      WBC, UA 0-1 /hpf      Epithelial Cells Occasional /hpf      Bacteria, UA Occasional /hpf      AMORPH PHOSPATES Occasional /hpf     UA (URINE) with reflex to Scope [[id number]]  (Abnormal) Collected: 01/01/24 0756    Lab Status: Final result Specimen: Urine, Clean Catch Updated: 01/01/24 0821     Color, UA Yellow     Clarity, UA Clear     Specific Gravity, UA 1.015     pH, UA 7.5     Leukocytes, UA Negative     Nitrite, UA Negative     Protein, UA Negative mg/dl      Glucose, UA Negative mg/dl      Ketones, UA Negative mg/dl      Urobilinogen, UA 0.2 E.U./dl      Bilirubin, UA Negative     Occult Blood, UA Trace-Intact    Comprehensive metabolic panel [[id number]]  (Abnormal) Collected: 01/01/24 0753    Lab Status: Final result Specimen: Blood from Arm, Right Updated: 01/01/24 0819     Sodium 137 mmol/L      Potassium 4.2 mmol/L      Chloride 104 mmol/L      CO2 26 mmol/L      ANION GAP 7 mmol/L       BUN 21 mg/dL      Creatinine 0.83 mg/dL      Glucose 141 mg/dL      Calcium 9.2 mg/dL      AST 16 U/L      ALT 21 U/L      Alkaline Phosphatase 77 U/L      Total Protein 7.1 g/dL      Albumin 4.5 g/dL      Total Bilirubin 0.53 mg/dL      eGFR 96 ml/min/1.73sq m     Narrative:      National Kid[employer] Disease Foundation guidelines for Chronic Kidney Disease (CKD):     Stage 1 with normal or high GFR (GFR > 90 mL/min/1.73 square meters)    Stage 2 Mild CKD (GFR = 60-89 mL/min/1.73 square meters)    Stage 3A Moderate CKD (GFR = 45-59 mL/min/1.73 square meters)    Stage 3B Moderate CKD (GFR = 30-44 mL/min/1.73 square meters)    Stage 4 Severe CKD (GFR = 15-29 mL/min/1.73 square meters)    Stage 5 End Stage CKD (GFR <15 mL/min/1.73 square meters)  Note: GFR calculation is accurate only with a steady state creatinine    Lipase [[id number]]  (Normal) Collected: 01/01/24 0753    Lab Status: Final result Specimen: Blood from Arm, Right Updated: 01/01/24 0819     Lipase 31 u/L     CBC and differential [[id number]] Collected: 01/01/24 0753    Lab Status: Final result Specimen: Blood from Arm, Right Updated: 01/01/24 0801     WBC 8.82 Thousand/uL      RBC 4.96 Million/uL      Hemoglobin 14.9 g/dL      Hematocrit 44.6 %      MCV 90 fL      MCH 30.0 pg      MCHC 33.4 g/dL      RDW 11.9 %      MPV 9.9 fL      Platelets 169 Thousands/uL      nRBC 0 /100 WBCs      Neutrophils Relative 65 %      Immat GRANS % 0 %      Lymphocytes Relative 25 %      Monocytes Relative 8 %      Eosinophils Relative 1 %      Basophils Relative 1 %      Neutrophils Absolute 5.86 Thousands/µL      Immature Grans Absolute 0.03 Thousand/uL      Lymphocytes Absolute 2.16 Thousands/µL      Monocytes Absolute 0.66 Thousand/µL      Eosinophils Absolute 0.07 Thousand/µL      Basophils Absolute 0.04 Thousands/µL                    CT renal stone study abdomen pelvis wo contrast   Final Result by Natalie Hector MD (01/01 0846)      Mild right-sided  hydronephrosis secondary to 5 mm mid right ureteral calculus. Additional punctate nonobstructing right lower pole calculus.      Large 1.5 cm bladder calculus.      The study was marked in EPIC for immediate notification.            Workstation performed: [id number]                    Procedures  Procedures         ED Course                               SBIRT 22yo+      Flowsheet Row Most Recent Value   Initial Alcohol Screen: US AUDIT-C     1. How often do you have a drink containing alcohol? 0 Filed at: 01/01/2024 0632   2. How many drinks containing alcohol do you have on a typical day you are drinking?  0 Filed at: 01/01/2024 0632   3a. Male UNDER 65: How often do you have five or more drinks on one occasion? 0 Filed at: 01/01/2024 0632   Audit-C Score 0 Filed at: 01/01/2024 0632   NANCI: How many times in the past year have you...    Used an illegal drug or used a prescription medication for non-medical reasons? Never Filed at: 01/01/2024 0632                      Medical Decision Making  0734: Patient appears moderately uncomfortable, vital signs reviewed.  Concerns for ureteral lithiasis.  Plan to complete basic labs including urinalysis.  Plan to complete CT and pelvis.  I will rehydrate with IV fluids, give analgesics for his discomfort and reevaluate.    0900: CT and labs reviewed.  Pain well-controlled.  Stable for discharge.  Follow-up with urology.    Amount and/or Complexity of Data Reviewed  Labs: ordered.  Radiology: ordered and independent interpretation performed.     Details: CT abdomen/pelvis 5 mm right mid ureteral calculi    Risk  Prescription drug management.             Disposition  Final diagnoses:   Ureterolithiasis   Right flank pain   Bladder calculus     Time reflects when diagnosis was documented in both MDM as applicable and the Disposition within this note       Time User Action Codes Description Comment    1/1/2024  8:50 AM Lazaro San Add [N20.1] Ureterolithiasis     1/1/2024   8:50 AM Lazaro San Add [R10.9] Right flank pain     1/1/2024  8:50 AM Lazaro San Add [N21.0] Bladder calculus           ED Disposition       ED Disposition   Discharge    Condition   Stable    Date/Time   Mon Jan 1, 2024 0850    Comment   Osei Villanuevataisha discharge to home/self care.                   Follow-up Information       Follow up With Specialties Details Why Contact Info Additional Information    [facility] Schedule an appointment as soon as possible for a visit   [address] [facility] [facility] [address]  [address] [facility] [address]  [phone] [facility], [address] [place] [address] [address], [address] Floor, [place], [address]     [phone]            Discharge Medication List as of 1/1/2024  8:52 AM        START taking these medications    Details   ibuprofen (MOTRIN) 600 mg tablet Take 1 tablet (600 mg total) by mouth every 6 (six) hours as needed for mild pain, Starting Mon 1/1/2024, Normal      oxyCODONE-acetaminophen (Percocet) 5-325 mg per tablet Take 1 tablet by mouth every 6 (six) hours as needed for moderate pain or severe pain for up to 10 days Max Daily Amount: 4 tablets, Starting Mon 1/1/2024, Until Thu 1/11/2024 at 2359, Normal      tamsulosin (FLOMAX) 0.4 mg Take 1 capsule (0.4 mg total) by mouth daily with dinner for 5 days, Starting Mon 1/1/2024, Until Sat 1/6/2024, Normal           CONTINUE these medications which have NOT CHANGED    Details   celecoxib (CeleBREX) 200 mg capsule Take 1 capsule (200 mg total) by mouth 2 (two) times a day, Starting Wed 3/15/2023, Normal      Magnesium Hydroxide (MAGNESIA PO) Take by mouth, Historical Med      Omega-3 Fatty Acids (FISH OIL PO) Take by mouth, Historical Med      omeprazole (PriLOSEC) 20 mg delayed release capsule Take 1 capsule (20 mg total) by mouth daily, Starting Wed 3/15/2023, Normal      pregabalin (LYRICA) 100 mg capsule TAKE 1 CAPSULE BY MOUTH THREE  TIMES A DAY, Starting Mon 10/30/2023, Normal      rosuvastatin (CRESTOR) 10 MG tablet Take by mouth, Starting Fri 12/3/2021, Historical Med             No discharge procedures on file.    PDMP Review         Value Time User    PDMP Reviewed  Yes 4/4/2022  2:27 PM Jeff Davenport MD            ED Provider  Electronically Signed by             Lazaro San MD  01/01/24 7743

## 2024-03-20 DIAGNOSIS — M54.12 CERVICAL RADICULOPATHY: ICD-10-CM

## 2024-03-20 RX ORDER — CELECOXIB 200 MG/1
200 CAPSULE ORAL 2 TIMES DAILY
Qty: 180 CAPSULE | Refills: 3 | Status: SHIPPED | OUTPATIENT
Start: 2024-03-20

## 2024-12-03 ENCOUNTER — TELEPHONE (OUTPATIENT)
Dept: RADIOLOGY | Facility: CLINIC | Age: 59
End: 2024-12-03

## 2024-12-03 DIAGNOSIS — M54.12 CERVICAL RADICULOPATHY: ICD-10-CM

## 2024-12-03 RX ORDER — PREGABALIN 100 MG/1
100 CAPSULE ORAL 3 TIMES DAILY
Qty: 270 CAPSULE | Refills: 0 | Status: SHIPPED | OUTPATIENT
Start: 2024-12-03

## 2024-12-03 NOTE — TELEPHONE ENCOUNTER
Called and spoke with patient. Patient is scheduled for 1/13 in office with Dr. Davenport for [facility]. Made patient aware that refill was still sent of medication.

## 2024-12-03 NOTE — TELEPHONE ENCOUNTER
02/28/2024 10/30/2023 Pregabalin (Capsule) 270.0 90 100 MG NA DESHAWN SOLIMAN [facility]

## 2025-01-08 RX ORDER — MAGNESIUM 30 MG
TABLET ORAL
COMMUNITY

## 2025-01-13 ENCOUNTER — OFFICE VISIT (OUTPATIENT)
Dept: PAIN MEDICINE | Facility: CLINIC | Age: 60
End: 2025-01-13
Payer: COMMERCIAL

## 2025-01-13 VITALS — WEIGHT: 195 LBS | BODY MASS INDEX: 28.88 KG/M2 | HEIGHT: 69 IN

## 2025-01-13 DIAGNOSIS — M54.12 CERVICAL RADICULOPATHY: ICD-10-CM

## 2025-01-13 PROCEDURE — 99213 OFFICE O/P EST LOW 20 MIN: CPT | Performed by: ANESTHESIOLOGY

## 2025-01-13 RX ORDER — PREGABALIN 100 MG/1
100 CAPSULE ORAL 3 TIMES DAILY
Qty: 270 CAPSULE | Refills: 3 | Status: SHIPPED | OUTPATIENT
Start: 2025-01-13

## 2025-01-13 RX ORDER — CELECOXIB 200 MG/1
200 CAPSULE ORAL 2 TIMES DAILY
Qty: 180 CAPSULE | Refills: 3 | Status: SHIPPED | OUTPATIENT
Start: 2025-01-13

## 2025-01-13 NOTE — PROGRESS NOTES
Assessment  1. Cervical radiculopathy  -     pregabalin (LYRICA) 100 mg capsule; Take 1 capsule (100 mg total) by mouth 3 (three) times a day  -     celecoxib (CeleBREX) 200 mg capsule; Take 1 capsule (200 mg total) by mouth 2 (two) times a day    Series of epidural steroid injections did not signfiicantly reduce patient's pain long term. Noting significant improvement in pain by at least 50% benefit with taking lyrica and celebrex at current dosing with regard to improved IADLs in significantly less pain and no side effects. MRI cervical spine noncontrast shows moderate 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 with marked left transforaminal stenosis. Risks, benefits and alternatives discussed with regard to medications, prn repeat injection; deferring surgery til exhaustion of conservative treatment efforts for now. Previously reported the following symptomatology:     Left-sided neck pain described primarily radicular features into the C5 and C6 dermatomal distribution accompanied by pain limited weakness numbness and paresthesias. +spurling's maneuver left sided; ttp over cervical paraspinal muscles, pain with cervical facet loading bilaterally, left greater than right. MRI from 2014 reviewed which shows moderate broad-based posterior disc protrusion at C5-C6.  Superimposed spondylosis and facet degenerative changes resulting in moderate canal stenosis and marked left and moderate right neural foraminal narrowing.  Additionally C6-C7 there is moderate broad-based disc protrusion asymmetrically increased to the left lateral reason at C6-C7.  There is findings suspicious for some degree of neural compression.  Superimposed spondylosis and changes resulting in mild canal narrowing.  Marked left and mild right neural foraminal stenosis.  Reasonable at this time to proceed with multimodal pain therapy plan focusing on physical therapy in conjunction with medications and epidural steroid  injection to target radicular pain.    Plan  -continue lyrica 100mg TID; counseled regarding sedative effects of taking this medication and provided up titration calendar.  Counseled not to take medication while driving or operating heavy machinery/using stairs  -celebrex 200 mg b.i.d. prn pain prescribed.  Patient educated regarding bleeding risk of taking this medication not taking any other nonsteroidal anti-inflammatory medications while taking this medication; counseled thoroughly regarding potential risk of Cardiovascular injury, Kidney injury, Gastrointestinal ulceration/bleeding. Patient voiced understanding; GI ppx written omeprazole 20mg/day  -physical therapy for left sided cervical radiculopathy; Physician directed home exercise plan as per [employer] demonstrated and handouts provided that patient plans to participate with for 1 hour, twice a week for the next 6 weeks.   -f/u 12 months or sooner if pain uncontrolled  -counseled to f/u with pcp for renal labs; most recent 2/9/23 WNL    There are risks associated with opioid medications, including dependence, addiction and tolerance. The patient understands and agrees to use these medications only as prescribed. Potential side effects of the medications include, but are not limited to, constipation, drowsiness, addiction, impaired judgment and risk of fatal overdose if not taken as prescribed. The patient was warned against driving while taking sedation medications.  Sharing medications is a felony. At this point in time, the patient is showing no signs of addiction, abuse, diversion or suicidal ideation.     [place] Prescription Drug Monitoring Program report was reviewed and was appropriate      Complete risks and benefits including bleeding, infection, tissue reaction, nerve injury and allergic reaction were discussed. The approach was demonstrated using models and literature was provided. Verbal and written consent was obtained.     My impressions and  treatment recommendations were discussed in detail with the patient who verbalized understanding and had no further questions.  Discharge instructions were provided. I personally saw and examined the patient and I agree with the above discussed plan of care.    New Medications Ordered This Visit   Medications    magnesium 30 MG tablet     Sig: Take by mouth    pregabalin (LYRICA) 100 mg capsule     Sig: Take 1 capsule (100 mg total) by mouth 3 (three) times a day     Dispense:  270 capsule     Refill:  3     This request is for a new prescription for a controlled substance as required by Federal/State law.    celecoxib (CeleBREX) 200 mg capsule     Sig: Take 1 capsule (200 mg total) by mouth 2 (two) times a day     Dispense:  180 capsule     Refill:  3       History of Present Illness    Osei Rodriguez is a 59 y.o. male with pmhx of XOL presenting with a past medical history of left-sided neck pain described primarily as radicular nature.  The pain radiates in the C5 and C6 dermatomal distributions and is primarily left-sided.  Patient had an acute exacerbation of the pain over the past few days after engaging in strenuous overhead maneuvers.  The pain contributes to significant disability in participation with independent activities of daily living and is accompanied by weakness numbness and paresthesias that are debilitating in nature.  The patient describes that overhead maneuvers such as combing hair is significantly limiting with respect to strength in the left arm/hand. The patient notes significant pain limited weakness with  left hand  as well. The patient has not been to physical therapy but was recently seen in ED where imaging of cervical spine showed at least moderate spondylosis and disc degeneration at C4-C7. He has trialed conservative measures including celebrex and flexeril for the pain but has not trialed any steroids.  He has never had interventional pain procedures in the past including any  cervical interlaminar epidural steroid injections in the past for his pain.    I have personally reviewed and/or updated the patient's past medical history, past surgical history, family history, social history, current medications, allergies, and vital signs today.     Review of Systems   Constitutional:  Positive for activity change.   HENT: Negative.     Eyes: Negative.    Respiratory: Negative.     Cardiovascular: Negative.    Gastrointestinal: Negative.    Endocrine: Negative.    Genitourinary: Negative.    Musculoskeletal:  Positive for arthralgias, myalgias, neck pain and neck stiffness.   Skin: Negative.    Allergic/Immunologic: Negative.    Neurological:  Positive for weakness and numbness.   Hematological: Negative.    Psychiatric/Behavioral: Negative.     All other systems reviewed and are negative.      Patient Active Problem List   Diagnosis    Cervical radiculopathy    Gastroesophageal reflux disease without esophagitis    Pain in thumb joint with movement of right hand    Pain in thumb joint with movement of left hand    Arthritis of carpometacarpal (CMC) joint of both thumbs       Past Medical History:   Diagnosis Date    Herniated disc, cervical        Past Surgical History:   Procedure Laterality Date    EPIDURAL BLOCK INJECTION N/A 2/17/2022    Procedure: BLOCK / INJECTION EPIDURAL STEROID CERVICAL C7-T1;  Surgeon: Jeff Davenport MD;  Location: [facility];  Service: Pain Management     EPIDURAL BLOCK INJECTION N/A 3/17/2022    Procedure: C7-T1 ILESI or alt level;  Surgeon: Jeff Davenport MD;  Location:  [facility];  Service: Pain Management     FL GUIDED NEEDLE PLAC BX/ASP/INJ  3/17/2022    JOINT REPLACEMENT      knee bilateral     REPLACEMENT TOTAL KNEE BILATERAL  2018 2019       Family History   Problem Relation Age of Onset    Arthritis Mother     Diabetes Father        Social History     Occupational History    Not on file   Tobacco Use    Smoking status: Never    Smokeless tobacco: Never  "  Vaping Use    Vaping status: Never Used   Substance and Sexual Activity    Alcohol use: Never     Comment: rarely     Drug use: Never    Sexual activity: Not on file       Current Outpatient Medications on File Prior to Visit   Medication Sig    magnesium 30 MG tablet Take by mouth    Magnesium Hydroxide (MAGNESIA PO) Take by mouth    Omega-3 Fatty Acids (FISH OIL PO) Take by mouth    omeprazole (PriLOSEC) 20 mg delayed release capsule Take 1 capsule (20 mg total) by mouth daily    rosuvastatin (CRESTOR) 10 MG tablet Take by mouth    [DISCONTINUED] celecoxib (CeleBREX) 200 mg capsule TAKE 1 CAPSULE BY MOUTH TWICE A DAY (Patient taking differently: Take 200 mg by mouth daily)    [DISCONTINUED] pregabalin (LYRICA) 100 mg capsule Take 1 capsule (100 mg total) by mouth 3 (three) times a day (Patient taking differently: Take 100 mg by mouth daily)    tamsulosin (FLOMAX) 0.4 mg Take 1 capsule (0.4 mg total) by mouth daily with dinner for 5 days    [DISCONTINUED] ibuprofen (MOTRIN) 600 mg tablet Take 1 tablet (600 mg total) by mouth every 6 (six) hours as needed for mild pain (Patient not taking: Reported on 1/13/2025)     No current facility-administered medications on file prior to visit.       No Known Allergies      Physical Exam    Ht 5' 9\" (1.753 m)   Wt 88.5 kg (195 lb)   BMI 28.80 kg/m²     Constitutional: normal, well developed, well nourished, alert, in no distress and non-toxic and no overt pain behavior.  Eyes: anicteric  HEENT: grossly intact  Neck: supple, symmetric, trachea midline and no masses   Pulmonary:even and unlabored  Cardiovascular:No edema or pitting edema present  Skin:Normal without rashes or lesions and well hydrated  Psychiatric:Mood and affect appropriate  Neurologic:Cranial Nerves II-XII grossly intact Sensation grossly intact; no clonus negative kern's. Reflexes 2+ and brisk. Spurling's maneuver positive left sided  Musculoskeletal:normal gait. 5/5 strength bilaterally with AROM in " all extremities. signficant ain with cervical facet loading bilaterally and with lateral spine rotation. ttp over cervical paraspinal muscles.     Imaging    CERVICAL SPINE     INDICATION:   neck pain.     COMPARISON:  None     VIEWS:  XR SPINE CERVICAL 2 OR 3 VW INJURY   Images: 3     FINDINGS:     No fracture or subluxation.      Straightening of the usual lordosis.     Moderate multilevel spondylitic changes C4-C7.      The prevertebral soft tissues are within normal limits.       The lung apices are clear.     IMPRESSION:     No acute osseous abnormality.     Degenerative changes as above.      MRI from 2014 reviewed which shows moderate broad-based posterior disc protrusion at C5-C6.  Superimposed spondylosis and facet degenerative changes resulting in moderate canal stenosis and marked left and moderate right neural foraminal narrowing.  Additionally C6-C7 there is moderate broad-based disc protrusion asymmetrically increased to the left lateral reason at C6-C7.  There is findings suspicious for some degree of neural compression.  Superimposed spondylosis and changes resulting in mild canal narrowing.  Marked left and mild right neural foraminal stenosis.

## 2025-01-13 NOTE — PATIENT INSTRUCTIONS
Patient Education     Neck Pain Exercises   About this topic   The neck or cervical spine has 7 spinal bones that run from the base of your skull to the upper back. These spinal bones have discs in between them. Discs act as shock absorbers. Ligaments are strong bands of tissue that hold the bones together. Many muscles surround and attach on these bones. Nerves come off of the spinal cord and exit out of small spaces in between the spinal bones. You can have neck pain if any of these are injured or damaged. Exercises may help to make this problem better.  General   Before starting with a program, ask your doctor if you are healthy enough to do these exercises. Your doctor may have you work with a  or physical therapist to make a safe exercise program to meet your needs. You should not do the exercises if they cause sharp pains, if you feel dizzy, or if you have vision changes.  Stretching Exercises   Stretching exercises keep your muscles flexible. They also stop them from getting tight. Start by doing each of these stretches 2 to 3 times. In order for your body to make changes, you will need to hold these stretches for 20 to 30 seconds. Try to do the stretches 2 to 3 times each day. Do all exercises slowly.  Passive neck stretches:  Put your left hand on top of your head. Your other arm can be at your side or behind your back. Pull your head toward your left shoulder until you feel a gentle stretch on the right side of your neck. Repeat on the other side using your other hand.  Also, try this stretch by pulling in a diagonal direction. With your left hand on top of your head, pull your head down towards the direction of your left knee. You should feel this stretch toward the back on the right side of your neck. Repeat on the other side.  Active neck stretches:  Neck front-to-back motion ? Look down to the floor until you feel a stretch in the back of your neck. Hold. Next, look up to the ceiling until you  feel a stretch in the front of your neck. Hold.  Neck side-to-side motion ? Tilt your head to the side and bring your ear to your shoulder until you feel a stretch on the other side of your neck. Hold. Next, tilt your head to the other side until you feel a stretch. Hold.  Neck turning ? Turn only your head and look over your left shoulder until you feel stretching in the right side of your neck. Hold. Now turn only your head and look over your right shoulder until you feel a stretch in the left side of your neck. Hold.  Scalene stretches ? Grasp your head with the hand opposite the side you want to stretch. Pull your head to the side until you feel a stretch. Now, slowly turn your head so your chin is pointed upwards.  Chin tucks ? Stand straight or lie down on your back. Tuck your chin in and lengthen the back of your neck. Return to the starting position and repeat. It may help to stand up against a wall during this exercise. Try gently pushing your chin with two fingers while trying to flatten your neck against the wall. If you do this exercise lying down, try using a small rolled up washcloth under your neck. Push down into the washcloth when tucking in your chin.  Strengthening Exercises   Strengthening exercises keep your muscles firm and strong. Start by repeating each exercise 2 to 3 times. Work up to doing each exercise 10 times. Try to do the exercises 2 to 3 times each day. Hold each exercise for 3 to 5 seconds. Do all exercises slowly.  Shoulder blade squeezes ? Pinch your shoulder blades together on your upper back and hold 3 to 5 seconds. Relax.             What will the results be?   Less pain and stiffness  Better range of motion  Increased strength  Help you heal faster after an injury or surgery  Increase blood flow to a body part  Help you feel better and more relaxed  Give you more energy  More toned looking muscles  Better posture  Easier to do daily activities  Helpful tips   Stay active and  work out to keep your muscles strong and flexible.  Be sure you do not hold your breath when exercising. This can raise your blood pressure. If you tend to hold your breath, try counting out loud when exercising. If any exercise bothers you, stop right away.  Try swinging your arms at an easy pace for a few minutes to warm up your muscles. Do this again after exercising.  Doing exercises before a meal may be a good way to get into a routine.  Exercise may be slightly uncomfortable, but you should not have sharp pains. If you do get sharp pains, stop what you are doing. If the sharp pains continue, call your doctor.  Last Reviewed Date   2020-03-10  Consumer Information Use and Disclaimer   This generalized information is a limited summary of diagnosis, treatment, and/or medication information. It is not meant to be comprehensive and should be used as a tool to help the user understand and/or assess potential diagnostic and treatment options. It does NOT include all information about conditions, treatments, medications, side effects, or risks that may apply to a specific patient. It is not intended to be medical advice or a substitute for the medical advice, diagnosis, or treatment of a health care provider based on the health care provider's examination and assessment of a patient’s specific and unique circumstances. Patients must speak with a health care provider for complete information about their health, medical questions, and treatment options, including any risks or benefits regarding use of medications. This information does not endorse any treatments or medications as safe, effective, or approved for treating a specific patient. [employer] and its affiliates disclaim any warranty or liability relating to this information or the use thereof. The use of this information is governed by the Terms of Use, available at https://www.woltersTipCityuwer.com/en/know/clinical-effectiveness-terms   Copyright   Copyright ©  2024 [employer] and its affiliates and/or licensors. All rights reserved.

## (undated) DEVICE — GAUZE SPONGES,USP TYPE VII GAUZE, 12 PLY: Brand: CURITY

## (undated) DEVICE — TRAY EPID CONT PERIFIX 18G X 3.5IN 10ML CLSD TIP

## (undated) DEVICE — SYRINGE 10ML LL

## (undated) DEVICE — NEEDLE BLUNT 18 G X 1 1/2IN

## (undated) DEVICE — TRAY EPID PERIFIX CUSTOM

## (undated) DEVICE — IV EXTENSION TUBING SMALL BORE

## (undated) DEVICE — SYRINGE 5ML LL

## (undated) DEVICE — NEEDLE 25G X 1 1/2

## (undated) DEVICE — CHLORAPREP HI-LITE 10.5ML ORANGE

## (undated) DEVICE — GLOVE INDICATOR PI UNDERGLOVE SZ 7.5 BLUE

## (undated) DEVICE — DRAPE SHEET THREE QUARTER

## (undated) DEVICE — PLASTIC ADHESIVE BANDAGE: Brand: CURITY

## (undated) DEVICE — DRAPE TOWEL: Brand: CONVERTORS

## (undated) DEVICE — SYRINGE LOR 8ML PLASTIC LL